# Patient Record
Sex: FEMALE | Race: WHITE | NOT HISPANIC OR LATINO
[De-identification: names, ages, dates, MRNs, and addresses within clinical notes are randomized per-mention and may not be internally consistent; named-entity substitution may affect disease eponyms.]

---

## 2017-03-09 ENCOUNTER — APPOINTMENT (OUTPATIENT)
Dept: HUMAN REPRODUCTION | Facility: CLINIC | Age: 42
End: 2017-03-09

## 2017-03-09 VITALS
DIASTOLIC BLOOD PRESSURE: 89 MMHG | RESPIRATION RATE: 17 BRPM | TEMPERATURE: 98.2 F | OXYGEN SATURATION: 98 % | SYSTOLIC BLOOD PRESSURE: 132 MMHG | HEART RATE: 85 BPM | BODY MASS INDEX: 31.09 KG/M2 | WEIGHT: 170 LBS

## 2017-03-15 ENCOUNTER — FORM ENCOUNTER (OUTPATIENT)
Age: 42
End: 2017-03-15

## 2017-03-16 ENCOUNTER — APPOINTMENT (OUTPATIENT)
Dept: RADIOLOGY | Facility: HOSPITAL | Age: 42
End: 2017-03-16

## 2017-03-16 ENCOUNTER — OUTPATIENT (OUTPATIENT)
Dept: OUTPATIENT SERVICES | Facility: HOSPITAL | Age: 42
LOS: 1 days | End: 2017-03-16
Payer: MEDICAID

## 2017-03-16 DIAGNOSIS — E66.01 MORBID (SEVERE) OBESITY DUE TO EXCESS CALORIES: ICD-10-CM

## 2017-03-16 PROCEDURE — 74220 X-RAY XM ESOPHAGUS 1CNTRST: CPT | Mod: 26

## 2017-03-16 PROCEDURE — 43999 UNLISTED PROCEDURE STOMACH: CPT

## 2017-03-16 PROCEDURE — 77002 NEEDLE LOCALIZATION BY XRAY: CPT

## 2017-05-25 ENCOUNTER — APPOINTMENT (OUTPATIENT)
Dept: DERMATOLOGY | Facility: CLINIC | Age: 42
End: 2017-05-25

## 2017-05-25 VITALS
HEIGHT: 62 IN | SYSTOLIC BLOOD PRESSURE: 128 MMHG | WEIGHT: 170 LBS | DIASTOLIC BLOOD PRESSURE: 78 MMHG | BODY MASS INDEX: 31.28 KG/M2

## 2017-05-25 DIAGNOSIS — Z84.0 FAMILY HISTORY OF DISEASES OF THE SKIN AND SUBCUTANEOUS TISSUE: ICD-10-CM

## 2017-05-25 DIAGNOSIS — L70.0 ACNE VULGARIS: ICD-10-CM

## 2017-08-07 ENCOUNTER — APPOINTMENT (OUTPATIENT)
Dept: SURGERY | Facility: CLINIC | Age: 42
End: 2017-08-07
Payer: MEDICAID

## 2017-08-07 VITALS
TEMPERATURE: 98.6 F | OXYGEN SATURATION: 99 % | WEIGHT: 186 LBS | HEIGHT: 61 IN | SYSTOLIC BLOOD PRESSURE: 125 MMHG | DIASTOLIC BLOOD PRESSURE: 95 MMHG | RESPIRATION RATE: 18 BRPM | BODY MASS INDEX: 35.12 KG/M2 | HEART RATE: 78 BPM

## 2017-08-07 PROCEDURE — 99215 OFFICE O/P EST HI 40 MIN: CPT

## 2017-08-22 ENCOUNTER — APPOINTMENT (OUTPATIENT)
Dept: ENDOCRINOLOGY | Facility: CLINIC | Age: 42
End: 2017-08-22
Payer: MEDICAID

## 2017-08-22 ENCOUNTER — APPOINTMENT (OUTPATIENT)
Dept: OBGYN | Facility: CLINIC | Age: 42
End: 2017-08-22
Payer: MEDICAID

## 2017-08-22 VITALS
BODY MASS INDEX: 36.63 KG/M2 | WEIGHT: 194 LBS | DIASTOLIC BLOOD PRESSURE: 82 MMHG | SYSTOLIC BLOOD PRESSURE: 121 MMHG | HEIGHT: 61 IN | HEART RATE: 85 BPM

## 2017-08-22 VITALS
WEIGHT: 194 LBS | HEIGHT: 61 IN | HEART RATE: 100 BPM | SYSTOLIC BLOOD PRESSURE: 120 MMHG | DIASTOLIC BLOOD PRESSURE: 80 MMHG | BODY MASS INDEX: 36.63 KG/M2 | OXYGEN SATURATION: 99 %

## 2017-08-22 DIAGNOSIS — R73.03 PREDIABETES.: ICD-10-CM

## 2017-08-22 DIAGNOSIS — E78.5 HYPERLIPIDEMIA, UNSPECIFIED: ICD-10-CM

## 2017-08-22 DIAGNOSIS — K21.9 GASTRO-ESOPHAGEAL REFLUX DISEASE W/OUT ESOPHAGITIS: ICD-10-CM

## 2017-08-22 DIAGNOSIS — R39.15 URGENCY OF URINATION: ICD-10-CM

## 2017-08-22 DIAGNOSIS — Z82.49 FAMILY HISTORY OF ISCHEMIC HEART DISEASE AND OTHER DISEASES OF THE CIRCULATORY SYSTEM: ICD-10-CM

## 2017-08-22 DIAGNOSIS — N39.46 MIXED INCONTINENCE: ICD-10-CM

## 2017-08-22 DIAGNOSIS — E28.2 POLYCYSTIC OVARIAN SYNDROME: ICD-10-CM

## 2017-08-22 PROCEDURE — 99205 OFFICE O/P NEW HI 60 MIN: CPT

## 2017-08-22 PROCEDURE — 99214 OFFICE O/P EST MOD 30 MIN: CPT

## 2017-08-22 RX ORDER — TRETINOIN 0.25 MG/G
0.03 CREAM TOPICAL
Qty: 1 | Refills: 4 | Status: DISCONTINUED | COMMUNITY
Start: 2017-05-25 | End: 2017-08-22

## 2017-08-23 ENCOUNTER — APPOINTMENT (OUTPATIENT)
Dept: OBGYN | Facility: CLINIC | Age: 42
End: 2017-08-23
Payer: MEDICAID

## 2017-08-23 ENCOUNTER — LABORATORY RESULT (OUTPATIENT)
Age: 42
End: 2017-08-23

## 2017-08-23 ENCOUNTER — ASOB RESULT (OUTPATIENT)
Age: 42
End: 2017-08-23

## 2017-08-23 PROCEDURE — 76830 TRANSVAGINAL US NON-OB: CPT

## 2017-08-25 LAB
ESTRADIOL SERPL-MCNC: 58 PG/ML
FSH SERPL-MCNC: 6 IU/L
GLUCOSE BLDC GLUCOMTR-MCNC: 185
HCG SERPL-MCNC: <1 MIU/ML
INSULIN P FAST SERPL-ACNC: 21.6 UU/ML
LH SERPL-ACNC: 10.7 IU/L
SHBG SERPL-SCNC: 22 NMOL/L
TESTOST SERPL-MCNC: 22.7 NG/DL

## 2017-08-28 LAB — BACTERIA UR CULT: NORMAL

## 2017-11-17 ENCOUNTER — APPOINTMENT (OUTPATIENT)
Dept: SURGERY | Facility: CLINIC | Age: 42
End: 2017-11-17
Payer: MEDICAID

## 2017-11-17 PROCEDURE — 99215 OFFICE O/P EST HI 40 MIN: CPT

## 2017-11-28 ENCOUNTER — OUTPATIENT (OUTPATIENT)
Dept: OUTPATIENT SERVICES | Facility: HOSPITAL | Age: 42
LOS: 1 days | End: 2017-11-28
Payer: MEDICAID

## 2017-11-28 VITALS
WEIGHT: 186.07 LBS | DIASTOLIC BLOOD PRESSURE: 84 MMHG | SYSTOLIC BLOOD PRESSURE: 130 MMHG | HEART RATE: 76 BPM | HEIGHT: 61 IN | TEMPERATURE: 98 F | RESPIRATION RATE: 16 BRPM | OXYGEN SATURATION: 98 %

## 2017-11-28 DIAGNOSIS — E66.01 MORBID (SEVERE) OBESITY DUE TO EXCESS CALORIES: ICD-10-CM

## 2017-11-28 DIAGNOSIS — Z98.84 BARIATRIC SURGERY STATUS: Chronic | ICD-10-CM

## 2017-11-28 DIAGNOSIS — Z01.818 ENCOUNTER FOR OTHER PREPROCEDURAL EXAMINATION: ICD-10-CM

## 2017-11-28 LAB
ALBUMIN SERPL ELPH-MCNC: 4.6 G/DL — SIGNIFICANT CHANGE UP (ref 3.3–5)
ALP SERPL-CCNC: 56 U/L — SIGNIFICANT CHANGE UP (ref 40–120)
ALT FLD-CCNC: 14 U/L — SIGNIFICANT CHANGE UP (ref 10–45)
ANION GAP SERPL CALC-SCNC: 15 MMOL/L — SIGNIFICANT CHANGE UP (ref 5–17)
AST SERPL-CCNC: 17 U/L — SIGNIFICANT CHANGE UP (ref 10–40)
BILIRUB SERPL-MCNC: 0.3 MG/DL — SIGNIFICANT CHANGE UP (ref 0.2–1.2)
BLD GP AB SCN SERPL QL: NEGATIVE — SIGNIFICANT CHANGE UP
BUN SERPL-MCNC: 10 MG/DL — SIGNIFICANT CHANGE UP (ref 7–23)
CALCIUM SERPL-MCNC: 10 MG/DL — SIGNIFICANT CHANGE UP (ref 8.4–10.5)
CHLORIDE SERPL-SCNC: 98 MMOL/L — SIGNIFICANT CHANGE UP (ref 96–108)
CO2 SERPL-SCNC: 25 MMOL/L — SIGNIFICANT CHANGE UP (ref 22–31)
CREAT SERPL-MCNC: 0.65 MG/DL — SIGNIFICANT CHANGE UP (ref 0.5–1.3)
GLUCOSE SERPL-MCNC: 130 MG/DL — HIGH (ref 70–99)
HBA1C BLD-MCNC: 5.6 % — SIGNIFICANT CHANGE UP (ref 4–5.6)
HCT VFR BLD CALC: 42.6 % — SIGNIFICANT CHANGE UP (ref 34.5–45)
HGB BLD-MCNC: 14.4 G/DL — SIGNIFICANT CHANGE UP (ref 11.5–15.5)
MCHC RBC-ENTMCNC: 30.2 PG — SIGNIFICANT CHANGE UP (ref 27–34)
MCHC RBC-ENTMCNC: 33.8 GM/DL — SIGNIFICANT CHANGE UP (ref 32–36)
MCV RBC AUTO: 89.3 FL — SIGNIFICANT CHANGE UP (ref 80–100)
PLATELET # BLD AUTO: 273 K/UL — SIGNIFICANT CHANGE UP (ref 150–400)
POTASSIUM SERPL-MCNC: 4.9 MMOL/L — SIGNIFICANT CHANGE UP (ref 3.5–5.3)
POTASSIUM SERPL-SCNC: 4.9 MMOL/L — SIGNIFICANT CHANGE UP (ref 3.5–5.3)
PROT SERPL-MCNC: 8.4 G/DL — HIGH (ref 6–8.3)
RBC # BLD: 4.77 M/UL — SIGNIFICANT CHANGE UP (ref 3.8–5.2)
RBC # FLD: 12.9 % — SIGNIFICANT CHANGE UP (ref 10.3–14.5)
RH IG SCN BLD-IMP: NEGATIVE — SIGNIFICANT CHANGE UP
SODIUM SERPL-SCNC: 138 MMOL/L — SIGNIFICANT CHANGE UP (ref 135–145)
WBC # BLD: 6.03 K/UL — SIGNIFICANT CHANGE UP (ref 3.8–10.5)
WBC # FLD AUTO: 6.03 K/UL — SIGNIFICANT CHANGE UP (ref 3.8–10.5)

## 2017-11-28 PROCEDURE — 80053 COMPREHEN METABOLIC PANEL: CPT

## 2017-11-28 PROCEDURE — 83036 HEMOGLOBIN GLYCOSYLATED A1C: CPT

## 2017-11-28 PROCEDURE — 86901 BLOOD TYPING SEROLOGIC RH(D): CPT

## 2017-11-28 PROCEDURE — G0463: CPT

## 2017-11-28 PROCEDURE — 86900 BLOOD TYPING SEROLOGIC ABO: CPT

## 2017-11-28 PROCEDURE — 85027 COMPLETE CBC AUTOMATED: CPT

## 2017-11-28 PROCEDURE — 86850 RBC ANTIBODY SCREEN: CPT

## 2017-11-28 RX ORDER — CEFAZOLIN SODIUM 1 G
2000 VIAL (EA) INJECTION ONCE
Qty: 0 | Refills: 0 | Status: DISCONTINUED | OUTPATIENT
Start: 2017-12-06 | End: 2017-12-07

## 2017-11-28 NOTE — H&P PST ADULT - HISTORY OF PRESENT ILLNESS
35 year old female with long history of obesity and trouble maintaining a weight loss.  Now for lap band surgery. 41 yo female, PMH morbid obesity s/p lap band 11/29/17, pt. having problems with GERD with lap band inflation, lost 50 lbs, but gained 30 lbs back. Pt. returns to [facility] today for Laparoscopic Removal of Lap Band, Laparoscopic Vertical Sleeve Gastrectomy on 12/6/17. Pt. denies recent fever, chills, chest pain, SOB, changes in bowel/urinary habits.  Raimundo Slade, pharmacist, will call pt. to go over current meds and post-op meds

## 2017-11-28 NOTE — H&P PST ADULT - NEUROLOGICAL DETAILS
alert and oriented x 3 alert and oriented x 3/sensation intact/responds to verbal commands/normal strength

## 2017-11-28 NOTE — H&P PST ADULT - PSH
S/P Nasal Septoplasty  2007  S/P Tonsillectomy  2001 S/P bariatric surgery  lap band 11/29/11  S/P Nasal Septoplasty  2007  S/P Tonsillectomy  2001

## 2017-11-28 NOTE — H&P PST ADULT - PROBLEM SELECTOR PLAN 1
Laparoscopic Removal of Lap Band, Laparoscopic Vertical Sleeve Gastrectomy  Pre-op education, including Chlorhexidine soap, provided - all questions answered

## 2017-11-28 NOTE — H&P PST ADULT - PMH
Hyperlipidemia    Morbid Obesity    Polycystic Ovarian Syndrome Morbid Obesity  BMI 35.2 on 11/28/17  Polycystic Ovarian Syndrome  not taking medications at this time

## 2017-11-28 NOTE — H&P PST ADULT - NSANTHOSAYNRD_GEN_A_CORE
No. IONA screening performed.  STOP BANG Legend: 0-2 = LOW Risk; 3-4 = INTERMEDIATE Risk; 5-8 = HIGH Risk

## 2017-11-28 NOTE — H&P PST ADULT - MUSCULOSKELETAL
details… No joint pain, swelling or deformity; no limitation of movement detailed exam normal strength/no calf tenderness negative

## 2017-11-28 NOTE — H&P PST ADULT - RS GEN PE MLT RESP DETAILS PC
clear to auscultation bilaterally no rhonchi/no rales/no wheezes/respirations non-labored/clear to auscultation bilaterally

## 2017-12-06 ENCOUNTER — INPATIENT (INPATIENT)
Facility: HOSPITAL | Age: 42
LOS: 0 days | Discharge: HOME | DRG: 621 | End: 2017-12-07
Attending: SURGERY | Admitting: SURGERY
Payer: MEDICAID

## 2017-12-06 ENCOUNTER — APPOINTMENT (OUTPATIENT)
Dept: SURGERY | Facility: HOSPITAL | Age: 42
End: 2017-12-06
Payer: MEDICAID

## 2017-12-06 ENCOUNTER — TRANSCRIPTION ENCOUNTER (OUTPATIENT)
Age: 42
End: 2017-12-06

## 2017-12-06 ENCOUNTER — RESULT REVIEW (OUTPATIENT)
Age: 42
End: 2017-12-06

## 2017-12-06 VITALS
OXYGEN SATURATION: 98 % | WEIGHT: 186.07 LBS | DIASTOLIC BLOOD PRESSURE: 88 MMHG | SYSTOLIC BLOOD PRESSURE: 123 MMHG | TEMPERATURE: 98 F | RESPIRATION RATE: 20 BRPM | HEIGHT: 61 IN | HEART RATE: 82 BPM

## 2017-12-06 DIAGNOSIS — E66.01 MORBID (SEVERE) OBESITY DUE TO EXCESS CALORIES: ICD-10-CM

## 2017-12-06 DIAGNOSIS — Z98.84 BARIATRIC SURGERY STATUS: Chronic | ICD-10-CM

## 2017-12-06 LAB
ANION GAP SERPL CALC-SCNC: 15 MMOL/L — SIGNIFICANT CHANGE UP (ref 5–17)
BASOPHILS # BLD AUTO: 0 K/UL — SIGNIFICANT CHANGE UP (ref 0–0.2)
BASOPHILS NFR BLD AUTO: 0.1 % — SIGNIFICANT CHANGE UP (ref 0–2)
BUN SERPL-MCNC: 10 MG/DL — SIGNIFICANT CHANGE UP (ref 7–23)
CALCIUM SERPL-MCNC: 8.9 MG/DL — SIGNIFICANT CHANGE UP (ref 8.4–10.5)
CHLORIDE SERPL-SCNC: 99 MMOL/L — SIGNIFICANT CHANGE UP (ref 96–108)
CO2 SERPL-SCNC: 23 MMOL/L — SIGNIFICANT CHANGE UP (ref 22–31)
CREAT SERPL-MCNC: 0.7 MG/DL — SIGNIFICANT CHANGE UP (ref 0.5–1.3)
EOSINOPHIL # BLD AUTO: 0 K/UL — SIGNIFICANT CHANGE UP (ref 0–0.5)
EOSINOPHIL NFR BLD AUTO: 0.3 % — SIGNIFICANT CHANGE UP (ref 0–6)
GLUCOSE BLDC GLUCOMTR-MCNC: 86 MG/DL — SIGNIFICANT CHANGE UP (ref 70–99)
GLUCOSE SERPL-MCNC: 178 MG/DL — HIGH (ref 70–99)
HCG UR QL: NEGATIVE — SIGNIFICANT CHANGE UP
HCT VFR BLD CALC: 38.1 % — SIGNIFICANT CHANGE UP (ref 34.5–45)
HGB BLD-MCNC: 13.4 G/DL — SIGNIFICANT CHANGE UP (ref 11.5–15.5)
LYMPHOCYTES # BLD AUTO: 0.7 K/UL — LOW (ref 1–3.3)
LYMPHOCYTES # BLD AUTO: 6.1 % — LOW (ref 13–44)
MAGNESIUM SERPL-MCNC: 2 MG/DL — SIGNIFICANT CHANGE UP (ref 1.6–2.6)
MCHC RBC-ENTMCNC: 32.3 PG — SIGNIFICANT CHANGE UP (ref 27–34)
MCHC RBC-ENTMCNC: 35.2 GM/DL — SIGNIFICANT CHANGE UP (ref 32–36)
MCV RBC AUTO: 91.9 FL — SIGNIFICANT CHANGE UP (ref 80–100)
MONOCYTES # BLD AUTO: 0.1 K/UL — SIGNIFICANT CHANGE UP (ref 0–0.9)
MONOCYTES NFR BLD AUTO: 1 % — LOW (ref 2–14)
NEUTROPHILS # BLD AUTO: 10.7 K/UL — HIGH (ref 1.8–7.4)
NEUTROPHILS NFR BLD AUTO: 92.5 % — HIGH (ref 43–77)
PHOSPHATE SERPL-MCNC: 2.9 MG/DL — SIGNIFICANT CHANGE UP (ref 2.5–4.5)
PLATELET # BLD AUTO: 203 K/UL — SIGNIFICANT CHANGE UP (ref 150–400)
POTASSIUM SERPL-MCNC: 4.1 MMOL/L — SIGNIFICANT CHANGE UP (ref 3.5–5.3)
POTASSIUM SERPL-SCNC: 4.1 MMOL/L — SIGNIFICANT CHANGE UP (ref 3.5–5.3)
RBC # BLD: 4.14 M/UL — SIGNIFICANT CHANGE UP (ref 3.8–5.2)
RBC # FLD: 11.9 % — SIGNIFICANT CHANGE UP (ref 10.3–14.5)
SODIUM SERPL-SCNC: 137 MMOL/L — SIGNIFICANT CHANGE UP (ref 135–145)
WBC # BLD: 11.6 K/UL — HIGH (ref 3.8–10.5)
WBC # FLD AUTO: 11.6 K/UL — HIGH (ref 3.8–10.5)

## 2017-12-06 PROCEDURE — 43775 LAP SLEEVE GASTRECTOMY: CPT

## 2017-12-06 PROCEDURE — 88300 SURGICAL PATH GROSS: CPT | Mod: 26,59

## 2017-12-06 PROCEDURE — 43774 LAP RMVL GASTR ADJ ALL PARTS: CPT

## 2017-12-06 PROCEDURE — 88305 TISSUE EXAM BY PATHOLOGIST: CPT | Mod: 26

## 2017-12-06 RX ORDER — POTASSIUM CHLORIDE 20 MEQ
10 PACKET (EA) ORAL
Qty: 0 | Refills: 0 | Status: DISCONTINUED | OUTPATIENT
Start: 2017-12-06 | End: 2017-12-07

## 2017-12-06 RX ORDER — HEPARIN SODIUM 5000 [USP'U]/ML
5000 INJECTION INTRAVENOUS; SUBCUTANEOUS EVERY 8 HOURS
Qty: 0 | Refills: 0 | Status: DISCONTINUED | OUTPATIENT
Start: 2017-12-06 | End: 2017-12-07

## 2017-12-06 RX ORDER — HYOSCYAMINE SULFATE 0.13 MG
0.12 TABLET ORAL EVERY 6 HOURS
Qty: 0 | Refills: 0 | Status: DISCONTINUED | OUTPATIENT
Start: 2017-12-06 | End: 2017-12-07

## 2017-12-06 RX ORDER — ONDANSETRON 8 MG/1
4 TABLET, FILM COATED ORAL ONCE
Qty: 0 | Refills: 0 | Status: DISCONTINUED | OUTPATIENT
Start: 2017-12-06 | End: 2017-12-06

## 2017-12-06 RX ORDER — PANTOPRAZOLE SODIUM 20 MG/1
40 TABLET, DELAYED RELEASE ORAL DAILY
Qty: 0 | Refills: 0 | Status: DISCONTINUED | OUTPATIENT
Start: 2017-12-06 | End: 2017-12-07

## 2017-12-06 RX ORDER — SODIUM CHLORIDE 9 MG/ML
1000 INJECTION, SOLUTION INTRAVENOUS
Qty: 0 | Refills: 0 | Status: COMPLETED | OUTPATIENT
Start: 2017-12-06 | End: 2017-12-06

## 2017-12-06 RX ORDER — ONDANSETRON 8 MG/1
4 TABLET, FILM COATED ORAL EVERY 6 HOURS
Qty: 0 | Refills: 0 | Status: DISCONTINUED | OUTPATIENT
Start: 2017-12-06 | End: 2017-12-07

## 2017-12-06 RX ORDER — CEFAZOLIN SODIUM 1 G
2000 VIAL (EA) INJECTION EVERY 8 HOURS
Qty: 0 | Refills: 0 | Status: COMPLETED | OUTPATIENT
Start: 2017-12-06 | End: 2017-12-07

## 2017-12-06 RX ORDER — LIDOCAINE HCL 20 MG/ML
0.2 VIAL (ML) INJECTION ONCE
Qty: 0 | Refills: 0 | Status: DISCONTINUED | OUTPATIENT
Start: 2017-12-06 | End: 2017-12-06

## 2017-12-06 RX ORDER — SODIUM CHLORIDE 9 MG/ML
3 INJECTION INTRAMUSCULAR; INTRAVENOUS; SUBCUTANEOUS EVERY 8 HOURS
Qty: 0 | Refills: 0 | Status: DISCONTINUED | OUTPATIENT
Start: 2017-12-06 | End: 2017-12-06

## 2017-12-06 RX ORDER — SODIUM CHLORIDE 9 MG/ML
1000 INJECTION, SOLUTION INTRAVENOUS
Qty: 0 | Refills: 0 | Status: DISCONTINUED | OUTPATIENT
Start: 2017-12-06 | End: 2017-12-07

## 2017-12-06 RX ORDER — ACETAMINOPHEN 500 MG
1000 TABLET ORAL ONCE
Qty: 0 | Refills: 0 | Status: COMPLETED | OUTPATIENT
Start: 2017-12-07 | End: 2017-12-07

## 2017-12-06 RX ORDER — HYDROMORPHONE HYDROCHLORIDE 2 MG/ML
0.5 INJECTION INTRAMUSCULAR; INTRAVENOUS; SUBCUTANEOUS
Qty: 0 | Refills: 0 | Status: DISCONTINUED | OUTPATIENT
Start: 2017-12-06 | End: 2017-12-06

## 2017-12-06 RX ORDER — ACETAMINOPHEN 500 MG
1000 TABLET ORAL ONCE
Qty: 0 | Refills: 0 | Status: COMPLETED | OUTPATIENT
Start: 2017-12-06 | End: 2017-12-06

## 2017-12-06 RX ORDER — KETOROLAC TROMETHAMINE 30 MG/ML
30 SYRINGE (ML) INJECTION EVERY 6 HOURS
Qty: 0 | Refills: 0 | Status: DISCONTINUED | OUTPATIENT
Start: 2017-12-06 | End: 2017-12-07

## 2017-12-06 RX ORDER — HEPARIN SODIUM 5000 [USP'U]/ML
5000 INJECTION INTRAVENOUS; SUBCUTANEOUS EVERY 12 HOURS
Qty: 0 | Refills: 0 | Status: DISCONTINUED | OUTPATIENT
Start: 2017-12-06 | End: 2017-12-06

## 2017-12-06 RX ADMIN — Medication 100 MILLIGRAM(S): at 23:15

## 2017-12-06 RX ADMIN — Medication 30 MILLIGRAM(S): at 23:14

## 2017-12-06 RX ADMIN — HYDROMORPHONE HYDROCHLORIDE 0.5 MILLIGRAM(S): 2 INJECTION INTRAMUSCULAR; INTRAVENOUS; SUBCUTANEOUS at 14:15

## 2017-12-06 RX ADMIN — Medication 1000 MILLIGRAM(S): at 23:55

## 2017-12-06 RX ADMIN — HEPARIN SODIUM 5000 UNIT(S): 5000 INJECTION INTRAVENOUS; SUBCUTANEOUS at 09:22

## 2017-12-06 RX ADMIN — Medication 0.12 MILLIGRAM(S): at 23:13

## 2017-12-06 RX ADMIN — SODIUM CHLORIDE 3 MILLILITER(S): 9 INJECTION INTRAMUSCULAR; INTRAVENOUS; SUBCUTANEOUS at 09:18

## 2017-12-06 RX ADMIN — PANTOPRAZOLE SODIUM 40 MILLIGRAM(S): 20 TABLET, DELAYED RELEASE ORAL at 14:24

## 2017-12-06 RX ADMIN — SODIUM CHLORIDE 150 MILLILITER(S): 9 INJECTION, SOLUTION INTRAVENOUS at 14:24

## 2017-12-06 RX ADMIN — SODIUM CHLORIDE 250 MILLILITER(S): 9 INJECTION, SOLUTION INTRAVENOUS at 18:09

## 2017-12-06 RX ADMIN — Medication 30 MILLIGRAM(S): at 18:08

## 2017-12-06 RX ADMIN — Medication 30 MILLIGRAM(S): at 23:55

## 2017-12-06 RX ADMIN — Medication 0.12 MILLIGRAM(S): at 18:08

## 2017-12-06 RX ADMIN — ONDANSETRON 4 MILLIGRAM(S): 8 TABLET, FILM COATED ORAL at 23:14

## 2017-12-06 RX ADMIN — HYDROMORPHONE HYDROCHLORIDE 0.5 MILLIGRAM(S): 2 INJECTION INTRAMUSCULAR; INTRAVENOUS; SUBCUTANEOUS at 14:00

## 2017-12-06 RX ADMIN — HEPARIN SODIUM 5000 UNIT(S): 5000 INJECTION INTRAVENOUS; SUBCUTANEOUS at 23:14

## 2017-12-06 RX ADMIN — Medication 400 MILLIGRAM(S): at 23:25

## 2017-12-06 NOTE — PHARMACY COMMUNICATION NOTE - COMMENTS
Patient medication reconciliation done. Patient currently taking:     Biotin 5000mcg by mouth daily  Vitamin D3 SL tab 2000units by mouth daily    Patient was informed to take daily multivitamins post surgically. Patient reeducated on NSAID avoidance (ibuprofen, ASA, naproxen, aleve) as they increased risk of GI bleeding. Patient informed to use APAP for mild pain otherwise contact prescriber for consult. Patient was informed on indications and directions for administration for hyoscyamine SL, oxycodone liquid, ondansetron ODT, and omeprazole . Patient was instructed to take the medications as follows:     Continue biotin and vitamin D3

## 2017-12-06 NOTE — CHART NOTE - NSCHARTNOTEFT_GEN_A_CORE
Post Operative Check    Patient is post op from a Removal, gastric band, laparoscopic Gastrectomy, sleeve, laparoscopic and is seen at bedside.  Denies chest pain, dyspnea, nausea, vomiting.    Vitals    T(C): 37.1 (12-06-17 @ 13:45), Max: 37.1 (12-06-17 @ 13:45)  HR: 81 (12-06-17 @ 16:00) (68 - 85)  BP: 128/79 (12-06-17 @ 16:00) (119/73 - 131/81)  RR: 16 (12-06-17 @ 16:00) (16 - 20)  SpO2: 98% (12-06-17 @ 16:00) (96% - 99%)      12-06 @ 07:01  -  12-06 @ 16:49  --------------------------------------------------------  IN:    lactated ringers.: 300 mL  Total IN: 300 mL    OUT:    Indwelling Catheter - Urethral: 350 mL  Total OUT: 350 mL    Total NET: -50 mL          Labs                        13.4   11.6  )-----------( 203      ( 06 Dec 2017 14:39 )             38.1       CBC Full  -  ( 06 Dec 2017 14:39 )  WBC Count : 11.6 K/uL  Hemoglobin : 13.4 g/dL  Hematocrit : 38.1 %  Platelet Count - Automated : 203 K/uL  Mean Cell Volume : 91.9 fl  Mean Cell Hemoglobin : 32.3 pg  Mean Cell Hemoglobin Concentration : 35.2 gm/dL  Auto Neutrophil # : 10.7 K/uL  Auto Lymphocyte # : 0.7 K/uL  Auto Monocyte # : 0.1 K/uL  Auto Eosinophil # : 0.0 K/uL  Auto Basophil # : 0.0 K/uL  Auto Neutrophil % : 92.5 %  Auto Lymphocyte % : 6.1 %  Auto Monocyte % : 1.0 %  Auto Eosinophil % : 0.3 %  Auto Basophil % : 0.1 %      Physical Exam  General: Lying in bed, NAD  CV: RRR  Pulm: clear, no increased work of breathing  Abdomen: soft, ND, appropriately tender, incisions C/D/I  Extremities: warm and well perfused. Grossly intact.      Patient is a 42y old Female s/p Removal of gastric band and laparoscopic sleeve Gastrectomy     - Pain control  - DVT ppx  - F/u AM labs  - NPO  - SQH  - Monitor UOP

## 2017-12-06 NOTE — BRIEF OPERATIVE NOTE - OPERATION/FINDINGS
Lap band in place with question of slippage.  Gastric band and port removed.  Sleeve gastrectomy performed over 36-Fr calibration tube. No leak on methylene blue test.  Staple line reinforced with Evicel.  Omentopexy performed.

## 2017-12-06 NOTE — PATIENT PROFILE ADULT. - PMH
Morbid Obesity  BMI 35.2 on 11/28/17  Polycystic Ovarian Syndrome  not taking medications at this time

## 2017-12-06 NOTE — BRIEF OPERATIVE NOTE - PRE-OP DX
Gastric band malfunction  12/06/2017    Active  Rahel Austin  Morbid obesity due to excess calories  12/06/2017    Active  Rahel Austin

## 2017-12-06 NOTE — CONSULT NOTE ADULT - ATTENDING COMMENTS
CARDIOLOGY ATTENDING    Agree with above. Tolerated procedure well. Is stable from cardiac perspective without any indications for further cardiac testing at this time.

## 2017-12-06 NOTE — BRIEF OPERATIVE NOTE - PROCEDURE
<<-----Click on this checkbox to enter Procedure Gastrectomy, sleeve, laparoscopic  12/06/2017    Active  SMARKHAM  Removal, gastric band, laparoscopic  12/06/2017    Active  SMARCORIAM

## 2017-12-07 ENCOUNTER — TRANSCRIPTION ENCOUNTER (OUTPATIENT)
Age: 42
End: 2017-12-07

## 2017-12-07 VITALS
OXYGEN SATURATION: 96 % | DIASTOLIC BLOOD PRESSURE: 75 MMHG | HEART RATE: 86 BPM | RESPIRATION RATE: 18 BRPM | TEMPERATURE: 98 F | SYSTOLIC BLOOD PRESSURE: 112 MMHG

## 2017-12-07 LAB
ANION GAP SERPL CALC-SCNC: 10 MMOL/L — SIGNIFICANT CHANGE UP (ref 5–17)
BASOPHILS # BLD AUTO: 0.1 K/UL — SIGNIFICANT CHANGE UP (ref 0–0.2)
BASOPHILS NFR BLD AUTO: 0.6 % — SIGNIFICANT CHANGE UP (ref 0–2)
BUN SERPL-MCNC: 8 MG/DL — SIGNIFICANT CHANGE UP (ref 7–23)
CALCIUM SERPL-MCNC: 8.5 MG/DL — SIGNIFICANT CHANGE UP (ref 8.4–10.5)
CHLORIDE SERPL-SCNC: 103 MMOL/L — SIGNIFICANT CHANGE UP (ref 96–108)
CO2 SERPL-SCNC: 23 MMOL/L — SIGNIFICANT CHANGE UP (ref 22–31)
CREAT SERPL-MCNC: 0.61 MG/DL — SIGNIFICANT CHANGE UP (ref 0.5–1.3)
EOSINOPHIL # BLD AUTO: 0 K/UL — SIGNIFICANT CHANGE UP (ref 0–0.5)
EOSINOPHIL NFR BLD AUTO: 0.2 % — SIGNIFICANT CHANGE UP (ref 0–6)
GLUCOSE SERPL-MCNC: 95 MG/DL — SIGNIFICANT CHANGE UP (ref 70–99)
HCT VFR BLD CALC: 36.9 % — SIGNIFICANT CHANGE UP (ref 34.5–45)
HGB BLD-MCNC: 12.7 G/DL — SIGNIFICANT CHANGE UP (ref 11.5–15.5)
LYMPHOCYTES # BLD AUTO: 1.1 K/UL — SIGNIFICANT CHANGE UP (ref 1–3.3)
LYMPHOCYTES # BLD AUTO: 11.1 % — LOW (ref 13–44)
MCHC RBC-ENTMCNC: 31.9 PG — SIGNIFICANT CHANGE UP (ref 27–34)
MCHC RBC-ENTMCNC: 34.4 GM/DL — SIGNIFICANT CHANGE UP (ref 32–36)
MCV RBC AUTO: 92.6 FL — SIGNIFICANT CHANGE UP (ref 80–100)
MONOCYTES # BLD AUTO: 0.7 K/UL — SIGNIFICANT CHANGE UP (ref 0–0.9)
MONOCYTES NFR BLD AUTO: 6.9 % — SIGNIFICANT CHANGE UP (ref 2–14)
NEUTROPHILS # BLD AUTO: 7.9 K/UL — HIGH (ref 1.8–7.4)
NEUTROPHILS NFR BLD AUTO: 81.2 % — HIGH (ref 43–77)
PLATELET # BLD AUTO: 212 K/UL — SIGNIFICANT CHANGE UP (ref 150–400)
POTASSIUM SERPL-MCNC: 3.7 MMOL/L — SIGNIFICANT CHANGE UP (ref 3.5–5.3)
POTASSIUM SERPL-SCNC: 3.7 MMOL/L — SIGNIFICANT CHANGE UP (ref 3.5–5.3)
RBC # BLD: 3.98 M/UL — SIGNIFICANT CHANGE UP (ref 3.8–5.2)
RBC # FLD: 11.8 % — SIGNIFICANT CHANGE UP (ref 10.3–14.5)
SODIUM SERPL-SCNC: 136 MMOL/L — SIGNIFICANT CHANGE UP (ref 135–145)
WBC # BLD: 9.8 K/UL — SIGNIFICANT CHANGE UP (ref 3.8–10.5)
WBC # FLD AUTO: 9.8 K/UL — SIGNIFICANT CHANGE UP (ref 3.8–10.5)

## 2017-12-07 PROCEDURE — 81025 URINE PREGNANCY TEST: CPT

## 2017-12-07 PROCEDURE — C1889: CPT

## 2017-12-07 PROCEDURE — 88305 TISSUE EXAM BY PATHOLOGIST: CPT

## 2017-12-07 PROCEDURE — 84100 ASSAY OF PHOSPHORUS: CPT

## 2017-12-07 PROCEDURE — 85027 COMPLETE CBC AUTOMATED: CPT

## 2017-12-07 PROCEDURE — 83735 ASSAY OF MAGNESIUM: CPT

## 2017-12-07 PROCEDURE — 82962 GLUCOSE BLOOD TEST: CPT

## 2017-12-07 PROCEDURE — 88300 SURGICAL PATH GROSS: CPT

## 2017-12-07 PROCEDURE — 80048 BASIC METABOLIC PNL TOTAL CA: CPT

## 2017-12-07 RX ORDER — ONDANSETRON 8 MG/1
1 TABLET, FILM COATED ORAL
Qty: 28 | Refills: 0
Start: 2017-12-07 | End: 2017-12-14

## 2017-12-07 RX ORDER — HYOSCYAMINE SULFATE 0.13 MG
1 TABLET ORAL
Qty: 28 | Refills: 0
Start: 2017-12-07 | End: 2017-12-14

## 2017-12-07 RX ORDER — OXYCODONE HYDROCHLORIDE 5 MG/1
5 TABLET ORAL
Qty: 120 | Refills: 0
Start: 2017-12-07 | End: 2017-12-11

## 2017-12-07 RX ORDER — OMEPRAZOLE 10 MG/1
1 CAPSULE, DELAYED RELEASE ORAL
Qty: 30 | Refills: 0 | OUTPATIENT
Start: 2017-12-07 | End: 2018-01-06

## 2017-12-07 RX ORDER — OMEPRAZOLE 10 MG/1
1 CAPSULE, DELAYED RELEASE ORAL
Qty: 30 | Refills: 0
Start: 2017-12-07 | End: 2018-01-06

## 2017-12-07 RX ADMIN — Medication 30 MILLIGRAM(S): at 05:41

## 2017-12-07 RX ADMIN — PANTOPRAZOLE SODIUM 40 MILLIGRAM(S): 20 TABLET, DELAYED RELEASE ORAL at 12:19

## 2017-12-07 RX ADMIN — Medication 30 MILLIGRAM(S): at 12:52

## 2017-12-07 RX ADMIN — Medication 400 MILLIGRAM(S): at 13:20

## 2017-12-07 RX ADMIN — Medication 100 MILLIGRAM(S): at 05:41

## 2017-12-07 RX ADMIN — Medication 30 MILLIGRAM(S): at 06:20

## 2017-12-07 RX ADMIN — HEPARIN SODIUM 5000 UNIT(S): 5000 INJECTION INTRAVENOUS; SUBCUTANEOUS at 13:20

## 2017-12-07 RX ADMIN — HEPARIN SODIUM 5000 UNIT(S): 5000 INJECTION INTRAVENOUS; SUBCUTANEOUS at 05:41

## 2017-12-07 RX ADMIN — Medication 30 MILLIGRAM(S): at 12:19

## 2017-12-07 RX ADMIN — Medication 400 MILLIGRAM(S): at 05:41

## 2017-12-07 RX ADMIN — Medication 0.12 MILLIGRAM(S): at 12:19

## 2017-12-07 RX ADMIN — Medication 1000 MILLIGRAM(S): at 06:20

## 2017-12-07 RX ADMIN — Medication 0.12 MILLIGRAM(S): at 06:32

## 2017-12-07 NOTE — DISCHARGE NOTE ADULT - HOSPITAL COURSE
On 12/6/17,  pt underwent laparoscopic sleeve gastrectomy and removal of gastric band. She tolerated the procedure well, was extubated and sent to PACU  in  stable condition.  She remained hemodynamically stable and was transferred to a bariatric unit with bedside continuous pulse oximetry.  Her pain was controlled by IV pain medications and then transitioned to liquid oral pain medications.  Max catheter was placed intraoperatively, catheter was subsequently removed and he passed trial of void.  POD #1 she was started on a bariatric clear liquid diet, which he tolerated.  She is ambulating independently, voiding, tolerating a diet, and with effective pain control.  She is stable for discharge home and will follow-up with Dr. Dean in 1-2 weeks.  She was also advised to follow-up with her primary medical doctor in 1-2 weeks as well as his nutritionist. On December 6, 2017 Ms. underwent Laparoscopic removal of adjustable gastric band converted to Laparoscopic Vertical Sleeve Gastrectomy for weight regain. She received VTE and SSI prophylaxis prior to start of procedure. Following intubation an indwelling taylor was placed . Procedure went as planned and she was subsequently extubated in the OR and taken to the recovery room. Postoperatively her pain was managed with opioid and non-opioid analgesia. Once hemodynamically stable she ambulated with assistance. She was later transferred to the bariatric unit and placed on bedside continuous pulse oximetry. Indwelling following taylor removed day of surgery and she had spontaneous return of bladder function.      On POD#1, she remained stable, had effective pain control and ambulated independently around the unit. She tolerated bariatric clear diet without nausea and or vomiting. The rest of her hospital course was uneventful and she was subsequently cleared for discharge. Procedure specific written discharge instructions explained, written materials given to include signs and symptoms of postop complications and VTE prevention. Routine bariatric medications obtained from pharmacy. She will follow a protocol-derived staged meal progression supervised by her dietician and follow up in 30 days. She will follow up with Dr Miner in 7-10 days with subsequent visits at one, three and six months, and then annually.

## 2017-12-07 NOTE — PROGRESS NOTE ADULT - ATTENDING COMMENTS
Patient seen and examined, agree with above assessment and plan as transcribed above.    - D/C plan per surgery    Raheem Cash MD, [facility], [facility]  [address] [place] [address]  PHONE:  [phone]  BEEPER : [phone]

## 2017-12-07 NOTE — PROGRESS NOTE ADULT - ASSESSMENT
Assessment and Plan:  42y y/o Female s/p Removal, gastric band, laparoscopic  Gastrectomy, sleeve, laparoscopic  , POD # 1    - Start PO liquid oxycodone PRN for pain  - Continue ambulation   - Encourage Incentive Spirometer use  - Bariartic clears   - Continue chemical and mechanical DVT prophylaxis  - Discharge home once tolerating adequate PO and 8 point discharge criteria met    Discuss with attending

## 2017-12-07 NOTE — DIETITIAN INITIAL EVALUATION ADULT. - ENERGY NEEDS
Ht: 61“, Wt: 186.1 lbs- presurgical, BMI: 35.2 kg/m2, IBW: 105 lbs (+/-10%)  no edema or pressure injuries

## 2017-12-07 NOTE — PROGRESS NOTE ADULT - SUBJECTIVE AND OBJECTIVE BOX
Bariatric Surgery Progress Note    Post Op Day#: 1    24 hr events/Subjective:     No acute issues overnight  Pain controlled with medications  Nausea controlled with anti-ematics   Voiding      Procedure:  Removal, gastric band, laparoscopic  Gastrectomy, sleeve, laparoscopic      Vital Signs Last 24 Hrs  T(C): 37.4 (07 Dec 2017 00:09), Max: 37.6 (06 Dec 2017 22:00)  T(F): 99.3 (07 Dec 2017 00:09), Max: 99.7 (06 Dec 2017 22:00)  HR: 92 (07 Dec 2017 00:09) (68 - 103)  BP: 130/84 (07 Dec 2017 00:09) (119/73 - 134/82)  BP(mean): 99 (06 Dec 2017 17:00) (91 - 102)  RR: 18 (07 Dec 2017 00:09) (16 - 20)  SpO2: 96% (07 Dec 2017 00:09) (93% - 99%)  Height (cm): 154.94 (12-06 @ 09:03)  Weight (kg): 84.4 (12-06 @ 08:23)  BMI (kg/m2): 35.2 (12-06 @ 09:03)  BSA (m2): 1.83 (12-06 @ 09:03)  I&O's Summary    06 Dec 2017 07:01  -  07 Dec 2017 03:24  --------------------------------------------------------  IN: 450 mL / OUT: 1100 mL / NET: -650 mL      I&O's Detail    06 Dec 2017 07:01  -  07 Dec 2017 03:24  --------------------------------------------------------  IN:    lactated ringers.: 450 mL  Total IN: 450 mL    OUT:    Indwelling Catheter - Urethral: 600 mL    Voided: 500 mL  Total OUT: 1100 mL    Total NET: -650 mL          Physical Exam:    General:  Appears stated age, well-groomed, well-nourished, no distress  Chest:  clear breath sounds  Cardiovascular:  Regular rate & rhythm  Abdomen:  Soft, incisions clean, dry intact, tenderness around incisions, no rebound or guarding  Skin:  No rash  Neuro/Psych:  Alert, oriented to time, place and person                           13.4   11.6  )-----------( 203      ( 06 Dec 2017 14:39 )             38.1       12-06    137  |  99  |  10  ----------------------------<  178<H>  4.1   |  23  |  0.70    Ca    8.9      06 Dec 2017 14:39  Phos  2.9     12-06  Mg     2.0     12-06        acetaminophen  IVPB. milliGRAM(s) IV Intermittent once  acetaminophen  IVPB. milliGRAM(s) IV Intermittent once  acetaminophen  IVPB. milliGRAM(s) IV Intermittent once  aluminum hydroxide/magnesium hydroxide/simethicone Suspension milliLiter(s) Oral every 4 hours PRN  ceFAZolin   IVPB milliGRAM(s) IV Intermittent every 8 hours  ceFAZolin   IVPB milliGRAM(s) IV Intermittent once  heparin  Injectable Unit(s) SubCutaneous every 8 hours  hyoscyamine SL milliGRAM(s) SubLingual every 6 hours  ketorolac   Injectable milliGRAM(s) IV Push every 6 hours  lactated ringers. milliLiter(s) IV Continuous <Continuous>  ondansetron Injectable milliGRAM(s) IV Push every 6 hours PRN  pantoprazole  Injectable milliGRAM(s) IV Push daily  potassium chloride  10 mEq/100 mL IVPB milliEquivalent(s) IV Intermittent every 1 hour PRN

## 2017-12-07 NOTE — PROGRESS NOTE ADULT - SUBJECTIVE AND OBJECTIVE BOX
Patient with no anginal chest pain or shortness of breath      MEDICATIONS  (STANDING):  acetaminophen  IVPB. 1000 milliGRAM(s) IV Intermittent once  acetaminophen  IVPB. 1000 milliGRAM(s) IV Intermittent once  ceFAZolin   IVPB 2000 milliGRAM(s) IV Intermittent once  heparin  Injectable 5000 Unit(s) SubCutaneous every 8 hours  hyoscyamine SL 0.125 milliGRAM(s) SubLingual every 6 hours  ketorolac   Injectable 30 milliGRAM(s) IV Push every 6 hours  lactated ringers. 1000 milliLiter(s) (150 mL/Hr) IV Continuous <Continuous>  pantoprazole  Injectable 40 milliGRAM(s) IV Push daily    MEDICATIONS  (PRN):  aluminum hydroxide/magnesium hydroxide/simethicone Suspension 30 milliLiter(s) Oral every 4 hours PRN Dyspepsia  ondansetron Injectable 4 milliGRAM(s) IV Push every 6 hours PRN Nausea  potassium chloride  10 mEq/100 mL IVPB 10 milliEquivalent(s) IV Intermittent every 1 hour PRN IF post-operative potassium is LESS THEN 3.5 milliMole(s)/L      LABS:                        12.7   9.8   )-----------( 212      ( 07 Dec 2017 05:40 )             36.9       12-07    136  |  103  |  8   ----------------------------<  95  3.7   |  23  |  0.61    Ca    8.5      07 Dec 2017 05:40  Phos  2.9     12-06  Mg     2.0     12-07      PHYSICAL EXAM:  Vital Signs Last 24 Hrs  T(C): 36.9 (07 Dec 2017 08:21), Max: 37.6 (06 Dec 2017 22:00)  T(F): 98.4 (07 Dec 2017 08:21), Max: 99.7 (06 Dec 2017 22:00)  HR: 80 (07 Dec 2017 08:21) (66 - 103)  BP: 117/74 (07 Dec 2017 08:21) (117/74 - 134/87)  BP(mean): 99 (06 Dec 2017 17:00) (91 - 102)  RR: 18 (07 Dec 2017 08:21) (16 - 20)  SpO2: 96% (07 Dec 2017 08:21) (93% - 99%)    HEENT: Normal Oral mucosa, PERRL, EOMI  Lymphatic: No obvious lymphadenopathy, No edema  Cardiovascular: Normal S1S2, No JVD, 1/6 OLRIN, Peripheral pulses palpable 2+ B/L  Respiratory: Lungs clear to auscultation, normal effort  Gastrointestinal: Abdomen soft, ND, NT, +BS  Skin: Warm, dry, intact. No cyanosis, No rash.  Musculoskeletal: Normal ROM, normal strength  Psychiatric: Appropriate Mood and Affect      DIAGNOSTIC DATA  TELEMETRY: n/a    RADIOLOGY: n/a     ASSESSMENT AND PLAN: This is a very pleasant 42 year old female with h/o HTN (currently off anti-hypertensives) , obesity  DM, PCOS, well known to our office with no h/o CAD with historically NL LV function on TTE and no ischemia on stress echo who is post op day #0 gastric Gastric band and port removal with sleeve gastrectomy. She tolerated the procedure well and currently has no anginal symptoms.     -pt tolerated procedure well   - BP well controlled  - HD stable  - care per primary team  - upon DC will arrange outpt f/u with her cardiologist Dr. Leyad Jamil [facility]  O:  [phone]  P: [phone]

## 2017-12-07 NOTE — DISCHARGE NOTE ADULT - MEDICATION SUMMARY - MEDICATIONS TO TAKE
I will START or STAY ON the medications listed below when I get home from the hospital:    oxyCODONE 5 mg/5 mL oral solution  -- 5 milliliter(s) by mouth every 4 hours MDD:20 mL  -- Caution federal law prohibits the transfer of this drug to any person other  than the person for whom it was prescribed.  May cause drowsiness.  Alcohol may intensify this effect.  Use care when operating dangerous machinery.  This prescription cannot be refilled.  Using more of this medication than prescribed may cause serious breathing problems.    -- Indication: For Post operative pain    ondansetron 4 mg oral tablet, disintegrating  -- 1 tab(s) by mouth every 6 hours MDD:4 tabs  -- Indication: For nasuea    hyoscyamine 0.125 mg sublingual tablet  -- 1 tab(s) under tongue every 6 hours MDD:4 tabs  -- Indication: For GI function    omeprazole 40 mg oral delayed release capsule  -- 1 cap(s) by mouth once a day MDD:1 tab, open capsule and mix with applesauce  -- It is very important that you take or use this exactly as directed.  Do not skip doses or discontinue unless directed by your doctor.  Obtain medical advice before taking any non-prescription drugs as some may affect the action of this medication.  Swallow whole.  Do not crush.    -- Indication: For GERD    multivitamin  -- 1 tab(s) by mouth once a day  -- Indication: For Supplement    Vitamin D3 2000 intl units oral tablet  -- 1 tab(s) by mouth once a day  -- Indication: For Supplement    biotin 5000 mcg oral tablet, disintegrating  -- 1 tab(s) by mouth once a day  -- Indication: For Supplement

## 2017-12-07 NOTE — DISCHARGE NOTE ADULT - ADDITIONAL INSTRUCTIONS
follow-up with Dr. Dean in 1-2 weeks.  She was also advised to follow-up with her primary medical doctor in 1-2 weeks as well as his nutritionist. Follow up with Dr. Crabtree, your cardiologist, regarding your recent hospitalization and your history of CAD.

## 2017-12-07 NOTE — DISCHARGE NOTE ADULT - INSTRUCTIONS
Continue bariatric clear liquid today and begin full liquid diet as instructed tomorrow for a period fo 2 weeks then advance to soft/puree

## 2017-12-07 NOTE — DISCHARGE NOTE ADULT - NS AS DC STROKE ED MATERIALS
Stroke Warning Signs and Symptoms/Risk Factors for Stroke/Call 911 for Stroke/Need for Followup After Discharge/Prescribed Medications/Stroke Education Booklet

## 2017-12-07 NOTE — DISCHARGE NOTE ADULT - CARE PROVIDER_API CALL
Cooper Miner (MD), Surgery  [address] [facility]  [address] [place] [id number]  Phone: [phone]  Fax: [phone]

## 2017-12-07 NOTE — DISCHARGE NOTE ADULT - NS AS ACTIVITY OBS
Showering allowed/No Heavy lifting/straining Walking-Indoors allowed/Showering allowed/Walking-Outdoors allowed/No Heavy lifting/straining

## 2017-12-07 NOTE — PROGRESS NOTE ADULT - SUBJECTIVE AND OBJECTIVE BOX
Post Op Day#: 1    Subjective: Feels good, No abdominal pain, mild soreness. .    Objective: OOB ambulated with assistance. Afebrile, v/s stable. Tolerating bariatric liquid. Denies nausea and vomiting Voided                                               Vital Signs Last 24 Hrs  T(C): 36.7 (07 Dec 2017 13:54), Max: 37.6 (06 Dec 2017 22:00)  T(F): 98.1 (07 Dec 2017 13:54), Max: 99.7 (06 Dec 2017 22:00)  HR: 86 (07 Dec 2017 13:54) (66 - 103)  BP: 112/75 (07 Dec 2017 13:54) (112/75 - 134/87)  BP(mean): 99 (06 Dec 2017 17:00) (95 - 100)  RR: 18 (07 Dec 2017 13:54) (16 - 18)  SpO2: 96% (07 Dec 2017 13:54) (93% - 99%)                                               I&O's Summary    06 Dec 2017 07:01  -  07 Dec 2017 07:00  --------------------------------------------------------  IN: 2350 mL / OUT: 1900 mL / NET: 450 mL    07 Dec 2017 07:01  -  07 Dec 2017 15:26  --------------------------------------------------------  IN: 360 mL / OUT: 0 mL / NET: 360 mL  oral 360ml+                                                                        12.7   9.8   )-----------( 212      ( 07 Dec 2017 05:40 )             36.9                                                 12-07    136  |  103  |  8   ----------------------------<  95  3.7   |  23  |  0.61    Ca    8.5      07 Dec 2017 05:40  Phos  2.9     12-06  Mg     2.0     12-06        Physical Exam:         Lungs:  clear breath sounds b/l       Heart:  Regular rate & rhythm       Abdomen:  Soft, non-distended.  Scopes sites clean, dry and intact. + bs, - flatus, no rebound or guarding       Skin:  No rash       Extremities: + pulses, no edema, no calf tenderness, negative hiral's                Caprini VTE Risk Score:  3-4(moderate ) No extended prophylaxis required post-discharge  [facility]ISK [place]ORE:  12 (low) No extended prophylaxis recommended post-discharge    Assessment and Plan: S/P lap Removal of AGB converted to Sleeve Gastrectomy    - Bariatric Clear diet today and then advanced to protocol derived staged meal progression supervised by RD  - GI prophylaxis, Incentive spirometry  - Ambulation at least every 3 hours  -  Procedure specific education including diet, vitamins and VTE prevention. Written materials given  - Medication reviewed and reconciled, Bariatric meds obtained from [facility]  -  Met Bariatric 8-Point d/c criteria, d/c home  -  Follow up with Dr Miner in 7-10 days, Dietitian and PMD in 30 days.      Hanna Fischer, DAV, ANP  [phone]

## 2017-12-07 NOTE — DIETITIAN INITIAL EVALUATION ADULT. - OTHER INFO
Consult received for bariatric surgery. Per chart pt has tried multiple wt loss attempts. Per outpatient RD note on 8/7/17, pt had lap band surgery in 2011 and lost almost 60 pounds and gained back 40 pounds. Pt was unable to see results after 2 years of lap band surgery and suffered from nausea/vomiting and port pain. Pt reports weighing 194 pounds before going on the 2 week full liquid diet and her presurgical wt was 186 pounds. Pt now S/P laparoscopic vertical sleeve gastrectomy. Pt denies nausea/vomiting, tolerating clear liquid bariatric diet at time of visit. Pt with knowledge of bariatric full liquid diet however receptive to in depth review/reinforcement. Pt states having protein shakes at home. Pt states she also purchased some of the necessary vitamins. Pt was advised to purchase chewable/liquid multivitamin with added elemental iron, chewable/liquid calcium citrate with vitamin D and sublingual B12. Pt was advised to take the chewable/liquid multivitamin with added elemental iron at least 2 hours apart from the chewable/liquid calcium citrate with vitamin D for optimal absorption. Pt states she plans to follow up with outpatient RD. Pt able to teach back all points discussed during interview.

## 2017-12-07 NOTE — DISCHARGE NOTE ADULT - CARE PLAN
Principal Discharge DX:	Morbid Obesity  Goal:	recovery from surgery  Instructions for follow-up, activity and diet:	WOUND CARE:  Please keep incisions clean and dry. Please do not Scrub or rub incisions. Do not use lotion or powder on incisions.   BATHING: Please do not submerge wound underwater. You may shower and/or sponge bathe.  ACTIVITY: No heavy lifting or straining. Otherwise, you may return to your usual level of physical activity. If you are taking narcotic pain medication (such as Oxycodone) DO NOT drive a car, operate machinery or make important decisions.  DIET: Continue bariatric clear liquid today and begin full liquid diet as instructed tomorrow for a period fo 2 weeks then advance to soft/puree  NOTIFY YOUR SURGEON IF: You have any bleeding that does not stop, any pus draining from your wound(s), any fever (over 100.4 F) or chills, persistent nausea/vomiting, persistent diarrhea, or if your pain is not controlled on your discharge pain medications.  FOLLOW-UP:  follow-up with Dr. Dean in 1-2 weeks.  She was also advised to follow-up with her primary medical doctor in 1-2 weeks as well as his nutritionist. Follow up with Dr. Crabtree, your cardiologist, regarding your recent hospitalization and your history of CAD. Principal Discharge DX:	Morbid Obesity  Goal:	recovery from surgery and practices healthy lifestyle changes for improve quality fo life  Instructions for follow-up, activity and diet:	WOUND CARE:  Please keep incisions clean and dry. Please do not Scrub or rub incisions. Do not use lotion or powder on incisions.   BATHING: Please do not submerge wound underwater. You may shower and/or sponge bathe.  ACTIVITY: No heavy lifting or straining. Otherwise, you may return to your usual level of physical activity. If you are taking narcotic pain medication (such as Oxycodone) DO NOT drive a car, operate machinery or make important decisions.  DIET: Continue bariatric clear liquid today and begin full liquid diet as instructed tomorrow for a period fo 2 weeks then advance to soft/puree  NOTIFY YOUR SURGEON IF: You have any bleeding that does not stop, any pus draining from your wound(s), any fever (over 100.4 F) or chills, persistent nausea/vomiting, persistent diarrhea, or if your pain is not controlled on your discharge pain medications.  FOLLOW-UP:  follow-up with Dr. Dean in 1-2 weeks.  She was also advised to follow-up with her primary medical doctor in 1-2 weeks as well as his nutritionist. Follow up with Dr. Crabtree, your cardiologist, regarding your recent hospitalization and your history of CAD.

## 2017-12-07 NOTE — DIETITIAN INITIAL EVALUATION ADULT. - ADHERENCE
Pt reports following a full liquid diet for 2 weeks PTA as instructed by outpatient RD. Pt reports having bone broth, Premier protein shakes and sugar free pudding.

## 2017-12-12 LAB — SURGICAL PATHOLOGY STUDY: SIGNIFICANT CHANGE UP

## 2017-12-15 ENCOUNTER — APPOINTMENT (OUTPATIENT)
Dept: SURGERY | Facility: CLINIC | Age: 42
End: 2017-12-15
Payer: MEDICAID

## 2017-12-15 VITALS
SYSTOLIC BLOOD PRESSURE: 118 MMHG | HEART RATE: 85 BPM | HEIGHT: 61.5 IN | TEMPERATURE: 98.7 F | RESPIRATION RATE: 16 BRPM | WEIGHT: 194 LBS | BODY MASS INDEX: 36.16 KG/M2 | DIASTOLIC BLOOD PRESSURE: 81 MMHG | OXYGEN SATURATION: 98 %

## 2017-12-15 VITALS — HEIGHT: 61.5 IN | WEIGHT: 178 LBS | BODY MASS INDEX: 33.18 KG/M2

## 2017-12-15 DIAGNOSIS — T78.40XA ALLERGY, UNSPECIFIED, INITIAL ENCOUNTER: ICD-10-CM

## 2017-12-15 PROCEDURE — 99024 POSTOP FOLLOW-UP VISIT: CPT

## 2017-12-15 RX ORDER — METFORMIN HYDROCHLORIDE 500 MG/1
500 TABLET, COATED ORAL
Qty: 60 | Refills: 4 | Status: DISCONTINUED | COMMUNITY
Start: 2017-08-25 | End: 2017-12-15

## 2017-12-15 RX ORDER — CLOBETASOL PROPIONATE 0.5 MG/G
0.05 OINTMENT TOPICAL TWICE DAILY
Qty: 1 | Refills: 0 | Status: DISCONTINUED | OUTPATIENT
Start: 2017-12-15 | End: 2017-12-15

## 2018-01-19 ENCOUNTER — APPOINTMENT (OUTPATIENT)
Dept: SURGERY | Facility: CLINIC | Age: 43
End: 2018-01-19
Payer: MEDICAID

## 2018-01-19 VITALS
DIASTOLIC BLOOD PRESSURE: 90 MMHG | HEART RATE: 87 BPM | BODY MASS INDEX: 32.06 KG/M2 | WEIGHT: 172 LBS | RESPIRATION RATE: 15 BRPM | TEMPERATURE: 98.2 F | OXYGEN SATURATION: 98 % | HEIGHT: 61.5 IN | SYSTOLIC BLOOD PRESSURE: 138 MMHG

## 2018-01-19 DIAGNOSIS — E66.9 OBESITY, UNSPECIFIED: ICD-10-CM

## 2018-01-19 DIAGNOSIS — Z87.898 PERSONAL HISTORY OF OTHER SPECIFIED CONDITIONS: ICD-10-CM

## 2018-01-19 PROCEDURE — 99024 POSTOP FOLLOW-UP VISIT: CPT

## 2018-01-19 RX ORDER — CLOBETASOL PROPIONATE 0.5 MG/G
0.05 OINTMENT TOPICAL TWICE DAILY
Qty: 1 | Refills: 0 | Status: DISCONTINUED | COMMUNITY
Start: 2017-12-16 | End: 2018-01-19

## 2018-01-22 ENCOUNTER — TRANSCRIPTION ENCOUNTER (OUTPATIENT)
Age: 43
End: 2018-01-22

## 2018-02-08 ENCOUNTER — APPOINTMENT (OUTPATIENT)
Dept: DERMATOLOGY | Facility: CLINIC | Age: 43
End: 2018-02-08
Payer: MEDICAID

## 2018-02-08 ENCOUNTER — APPOINTMENT (OUTPATIENT)
Dept: DERMATOLOGY | Facility: CLINIC | Age: 43
End: 2018-02-08

## 2018-02-08 VITALS
TEMPERATURE: 98.1 F | BODY MASS INDEX: 32.43 KG/M2 | SYSTOLIC BLOOD PRESSURE: 134 MMHG | HEIGHT: 61.5 IN | OXYGEN SATURATION: 99 % | WEIGHT: 174 LBS | DIASTOLIC BLOOD PRESSURE: 91 MMHG | HEART RATE: 76 BPM

## 2018-02-08 DIAGNOSIS — Z12.83 ENCOUNTER FOR SCREENING FOR MALIGNANT NEOPLASM OF SKIN: ICD-10-CM

## 2018-02-08 DIAGNOSIS — L85.8 OTHER SPECIFIED EPIDERMAL THICKENING: ICD-10-CM

## 2018-02-08 DIAGNOSIS — L65.9 NONSCARRING HAIR LOSS, UNSPECIFIED: ICD-10-CM

## 2018-02-08 DIAGNOSIS — D22.9 MELANOCYTIC NEVI, UNSPECIFIED: ICD-10-CM

## 2018-02-08 DIAGNOSIS — L21.9 SEBORRHEIC DERMATITIS, UNSPECIFIED: ICD-10-CM

## 2018-02-08 DIAGNOSIS — L74.519 PRIMARY FOCAL HYPERHIDROSIS, UNSPECIFIED: ICD-10-CM

## 2018-02-08 PROCEDURE — 99213 OFFICE O/P EST LOW 20 MIN: CPT

## 2018-02-09 ENCOUNTER — APPOINTMENT (OUTPATIENT)
Dept: DERMATOLOGY | Facility: CLINIC | Age: 43
End: 2018-02-09

## 2018-03-13 ENCOUNTER — TRANSCRIPTION ENCOUNTER (OUTPATIENT)
Age: 43
End: 2018-03-13

## 2018-03-20 ENCOUNTER — APPOINTMENT (OUTPATIENT)
Dept: OBGYN | Facility: CLINIC | Age: 43
End: 2018-03-20
Payer: MEDICAID

## 2018-03-20 VITALS
DIASTOLIC BLOOD PRESSURE: 80 MMHG | BODY MASS INDEX: 31.53 KG/M2 | HEIGHT: 61 IN | OXYGEN SATURATION: 98 % | HEART RATE: 100 BPM | WEIGHT: 167 LBS | SYSTOLIC BLOOD PRESSURE: 120 MMHG

## 2018-03-20 DIAGNOSIS — Z12.31 ENCOUNTER FOR SCREENING MAMMOGRAM FOR MALIGNANT NEOPLASM OF BREAST: ICD-10-CM

## 2018-03-20 DIAGNOSIS — R92.2 INCONCLUSIVE MAMMOGRAM: ICD-10-CM

## 2018-03-20 PROCEDURE — 99396 PREV VISIT EST AGE 40-64: CPT

## 2018-03-20 RX ORDER — ALUMINUM CHLORIDE 20 %
20 SOLUTION, NON-ORAL TOPICAL
Qty: 100 | Refills: 2 | Status: DISCONTINUED | COMMUNITY
Start: 2018-02-08 | End: 2018-03-20

## 2018-03-20 RX ORDER — KETOCONAZOLE 20.5 MG/ML
2 SHAMPOO, SUSPENSION TOPICAL
Qty: 1 | Refills: 11 | Status: DISCONTINUED | COMMUNITY
Start: 2018-02-08 | End: 2018-03-20

## 2018-03-22 LAB
C TRACH RRNA SPEC QL NAA+PROBE: NOT DETECTED
HPV HIGH+LOW RISK DNA PNL CVX: NOT DETECTED
N GONORRHOEA RRNA SPEC QL NAA+PROBE: NOT DETECTED
SOURCE TP AMPLIFICATION: NORMAL

## 2018-03-23 ENCOUNTER — APPOINTMENT (OUTPATIENT)
Dept: SURGERY | Facility: CLINIC | Age: 43
End: 2018-03-23

## 2018-03-27 ENCOUNTER — FORM ENCOUNTER (OUTPATIENT)
Age: 43
End: 2018-03-27

## 2018-03-27 LAB — CYTOLOGY CVX/VAG DOC THIN PREP: NORMAL

## 2018-03-28 ENCOUNTER — APPOINTMENT (OUTPATIENT)
Dept: ULTRASOUND IMAGING | Facility: CLINIC | Age: 43
End: 2018-03-28
Payer: MEDICAID

## 2018-03-28 ENCOUNTER — APPOINTMENT (OUTPATIENT)
Dept: MAMMOGRAPHY | Facility: CLINIC | Age: 43
End: 2018-03-28
Payer: MEDICAID

## 2018-03-28 ENCOUNTER — OUTPATIENT (OUTPATIENT)
Dept: OUTPATIENT SERVICES | Facility: HOSPITAL | Age: 43
LOS: 1 days | End: 2018-03-28
Payer: MEDICAID

## 2018-03-28 DIAGNOSIS — Z00.8 ENCOUNTER FOR OTHER GENERAL EXAMINATION: ICD-10-CM

## 2018-03-28 DIAGNOSIS — R92.2 INCONCLUSIVE MAMMOGRAM: ICD-10-CM

## 2018-03-28 DIAGNOSIS — Z98.84 BARIATRIC SURGERY STATUS: Chronic | ICD-10-CM

## 2018-03-28 PROCEDURE — 76641 ULTRASOUND BREAST COMPLETE: CPT | Mod: 26,50

## 2018-03-28 PROCEDURE — 77067 SCR MAMMO BI INCL CAD: CPT

## 2018-03-28 PROCEDURE — 77063 BREAST TOMOSYNTHESIS BI: CPT | Mod: 26

## 2018-03-28 PROCEDURE — 77063 BREAST TOMOSYNTHESIS BI: CPT

## 2018-03-28 PROCEDURE — 77067 SCR MAMMO BI INCL CAD: CPT | Mod: 26

## 2018-03-28 PROCEDURE — 76641 ULTRASOUND BREAST COMPLETE: CPT

## 2018-05-10 ENCOUNTER — APPOINTMENT (OUTPATIENT)
Dept: HUMAN REPRODUCTION | Facility: CLINIC | Age: 43
End: 2018-05-10
Payer: SELF-PAY

## 2018-05-10 VITALS
SYSTOLIC BLOOD PRESSURE: 125 MMHG | WEIGHT: 168 LBS | BODY MASS INDEX: 31.74 KG/M2 | OXYGEN SATURATION: 97 % | RESPIRATION RATE: 18 BRPM | DIASTOLIC BLOOD PRESSURE: 85 MMHG | TEMPERATURE: 97.8 F | HEART RATE: 82 BPM

## 2018-05-10 PROCEDURE — 99214 OFFICE O/P EST MOD 30 MIN: CPT

## 2018-09-20 ENCOUNTER — APPOINTMENT (OUTPATIENT)
Dept: DERMATOLOGY | Facility: CLINIC | Age: 43
End: 2018-09-20
Payer: MEDICAID

## 2018-09-20 VITALS
TEMPERATURE: 98.7 F | SYSTOLIC BLOOD PRESSURE: 131 MMHG | HEART RATE: 82 BPM | OXYGEN SATURATION: 96 % | WEIGHT: 163 LBS | HEIGHT: 61 IN | DIASTOLIC BLOOD PRESSURE: 86 MMHG | BODY MASS INDEX: 30.78 KG/M2

## 2018-09-20 DIAGNOSIS — L82.1 OTHER SEBORRHEIC KERATOSIS: ICD-10-CM

## 2018-09-20 PROCEDURE — 99213 OFFICE O/P EST LOW 20 MIN: CPT

## 2018-10-01 PROBLEM — L82.1 SEBORRHEIC KERATOSIS: Status: ACTIVE | Noted: 2018-10-01

## 2019-02-20 ENCOUNTER — APPOINTMENT (OUTPATIENT)
Dept: PULMONOLOGY | Facility: CLINIC | Age: 44
End: 2019-02-20

## 2019-05-09 ENCOUNTER — APPOINTMENT (OUTPATIENT)
Dept: OTOLARYNGOLOGY | Facility: CLINIC | Age: 44
End: 2019-05-09
Payer: MEDICAID

## 2019-05-09 VITALS
HEIGHT: 61 IN | HEART RATE: 96 BPM | BODY MASS INDEX: 32.1 KG/M2 | SYSTOLIC BLOOD PRESSURE: 137 MMHG | DIASTOLIC BLOOD PRESSURE: 89 MMHG | WEIGHT: 170 LBS

## 2019-05-09 DIAGNOSIS — J34.89 OTHER SPECIFIED DISORDERS OF NOSE AND NASAL SINUSES: ICD-10-CM

## 2019-05-09 DIAGNOSIS — R09.82 POSTNASAL DRIP: ICD-10-CM

## 2019-05-09 DIAGNOSIS — R49.0 DYSPHONIA: ICD-10-CM

## 2019-05-09 DIAGNOSIS — J30.89 OTHER ALLERGIC RHINITIS: ICD-10-CM

## 2019-05-09 DIAGNOSIS — J30.2 OTHER ALLERGIC RHINITIS: ICD-10-CM

## 2019-05-09 DIAGNOSIS — J30.0 VASOMOTOR RHINITIS: ICD-10-CM

## 2019-05-09 DIAGNOSIS — R09.81 NASAL CONGESTION: ICD-10-CM

## 2019-05-09 PROCEDURE — 31231 NASAL ENDOSCOPY DX: CPT

## 2019-05-09 PROCEDURE — 99204 OFFICE O/P NEW MOD 45 MIN: CPT | Mod: 25

## 2019-05-09 NOTE — HISTORY OF PRESENT ILLNESS
[de-identified] : 44 y/o female with over 20 year history of nasal obstruction/congestion and PND. She's had 2 prior nasal surgeries \par Deviated septum repair and turbinate reduction in 2005\par Rhinoplasty in 2019\par Feels breathing has improved after nasal surgeries however, still with PND, nasal or muffled speech, intermittent sinus pressure/pain and intermittent nasal congestion. Prior allergy testing positive to dust mites, ragweed, pet dander. Has tried Flonase in the past which helps, still uses PRN with saline irrigation PRN. She is also concerned about a crust in the right nostril which she feels with a q-tip when she cleans her nose. No runny nose. \par \par + Throat clearing. No throat pain or difficulty swallowing.\par \par \par \par \par  [FreeTextEntry1] : \par \par

## 2019-05-09 NOTE — ASSESSMENT
[FreeTextEntry1] : pt with PND and globus\par atrovent\par \par right nasal congestion s/p rhino - scar band right septum to lateral wall - offered to lyse - will see plastic surgeon first \par irrigation\par allergy avoidance\par nasal precautions\par irrigation \par \par \par \par I personally saw and examined CHARLOTTE WARD in detail. I spoke to JERRY DIAZ regarding the assessment and plan of care.  I preformed the procedures and I reviewed the above assessment and plan of care, and agree. I have made changes in changes in the body of the note where appropriate.\par

## 2019-05-09 NOTE — PROCEDURE
[FreeTextEntry6] : Procedure performed: Nasal Endoscopy- Diagnostic\par Pre / post -procedure indication(s): nasal congestion\par Verbal and/or written consent obtained from patient\par Scope #:19 ,  flexible fiber optic telescope used\par The scope was introduced in the nasal passage between the middle and inferior turbinates to exam the inferior portion of the middle meatus and the fontanelle, as well as the maxillary ostia.  Next, the scope was passed medically and posteriorly to the middle turbinates to examine the sphenoethmoid recess and the superior turbinate region.\par Upon visualization the finders are as follows:\par Nasal Septum: septum midline- scar band on right\par B/L: Mucosa: pink and moist, Mucous: scant, Polyp: not seen, Inferior Turbinate, Middle and Superior Turbinate: normal, Inferior Meatus: narrow, Middle Meatus: narrow, Super Meatus:normal, Sphenoethmoidal Recess: clear.  Eustachian tube clear. \par The patient tolerated the procedure well\par  [de-identified] : Procedure performed: laryngeal Endoscopy- Diagnostic\par Pre-op/post op indication: dysphonia\par Verbal and/or written consent obtained from patient, Patient was unable to cooperate with mirror\par Scope #: 19, flexible fiber optic telescope used \par Scope was introduced through the nose passed on the floor of the nose to the nasopharynx and then followed down the soft palate to the lower pharynx. The tongue Base, Larynx, Hypopharynx were examined. Base of tongue was symmetric, vallecular was clear, epiglottis was not deformed, subglottis/ pyriform and posterior pharyngeal walls were clear. No erythema, edema, pooling of secretions, masses or lesions. Airway patent, no foreign body visualized. No glottic/supraglottic edema. VF clear arytenoids, vestibular folds, ventricles, pyriform sinuses, and aryepiglottic folds appear normal bilaterally. Vocal cords mobile with good contact b/l.\par \par

## 2019-05-09 NOTE — CONSULT LETTER
[FreeTextEntry1] : Dear Dr. ANU MOY \par I had the pleasure of evaluating your patient JESUS WEST  thank you for allowing us to participate in their care. please see full note detailing our visit below.\par If you have any questions, please do not hesitate to call me and I would be happy to discuss further. \par \par David Mcagrry M.D.\par Attending Physician,  \par Department of Otolaryngology - Head and Neck Surgery\par [facility] \par Office: [phone]\par Fax: [phone]\par

## 2019-05-17 ENCOUNTER — APPOINTMENT (OUTPATIENT)
Dept: SURGERY | Facility: CLINIC | Age: 44
End: 2019-05-17

## 2019-10-18 ENCOUNTER — EMERGENCY (EMERGENCY)
Facility: HOSPITAL | Age: 44
LOS: 1 days | Discharge: HOME | End: 2019-10-18
Attending: EMERGENCY MEDICINE
Payer: MEDICAID

## 2019-10-18 DIAGNOSIS — Z98.84 BARIATRIC SURGERY STATUS: Chronic | ICD-10-CM

## 2019-10-18 PROCEDURE — 99285 EMERGENCY DEPT VISIT HI MDM: CPT

## 2019-10-19 VITALS
TEMPERATURE: 98 F | SYSTOLIC BLOOD PRESSURE: 122 MMHG | HEART RATE: 80 BPM | HEIGHT: 62 IN | OXYGEN SATURATION: 98 % | RESPIRATION RATE: 16 BRPM | WEIGHT: 179.9 LBS | DIASTOLIC BLOOD PRESSURE: 86 MMHG

## 2019-10-19 LAB
ALBUMIN SERPL ELPH-MCNC: 3.6 G/DL — SIGNIFICANT CHANGE UP (ref 3.5–5)
ALP SERPL-CCNC: 54 U/L — SIGNIFICANT CHANGE UP (ref 40–120)
ALT FLD-CCNC: 20 U/L DA — SIGNIFICANT CHANGE UP (ref 10–60)
ANION GAP SERPL CALC-SCNC: 5 MMOL/L — SIGNIFICANT CHANGE UP (ref 5–17)
AST SERPL-CCNC: 14 U/L — SIGNIFICANT CHANGE UP (ref 10–40)
BILIRUB SERPL-MCNC: 0.3 MG/DL — SIGNIFICANT CHANGE UP (ref 0.2–1.2)
BUN SERPL-MCNC: 12 MG/DL — SIGNIFICANT CHANGE UP (ref 7–18)
CALCIUM SERPL-MCNC: 9.1 MG/DL — SIGNIFICANT CHANGE UP (ref 8.4–10.5)
CHLORIDE SERPL-SCNC: 107 MMOL/L — SIGNIFICANT CHANGE UP (ref 96–108)
CO2 SERPL-SCNC: 26 MMOL/L — SIGNIFICANT CHANGE UP (ref 22–31)
CREAT SERPL-MCNC: 0.89 MG/DL — SIGNIFICANT CHANGE UP (ref 0.5–1.3)
GLUCOSE SERPL-MCNC: 115 MG/DL — HIGH (ref 70–99)
HCG SERPL-ACNC: <1 MIU/ML — SIGNIFICANT CHANGE UP
HCT VFR BLD CALC: 39 % — SIGNIFICANT CHANGE UP (ref 34.5–45)
HGB BLD-MCNC: 13.1 G/DL — SIGNIFICANT CHANGE UP (ref 11.5–15.5)
MCHC RBC-ENTMCNC: 29.8 PG — SIGNIFICANT CHANGE UP (ref 27–34)
MCHC RBC-ENTMCNC: 33.6 GM/DL — SIGNIFICANT CHANGE UP (ref 32–36)
MCV RBC AUTO: 88.8 FL — SIGNIFICANT CHANGE UP (ref 80–100)
NRBC # BLD: 0 /100 WBCS — SIGNIFICANT CHANGE UP (ref 0–0)
PLATELET # BLD AUTO: 215 K/UL — SIGNIFICANT CHANGE UP (ref 150–400)
POTASSIUM SERPL-MCNC: 3.7 MMOL/L — SIGNIFICANT CHANGE UP (ref 3.5–5.3)
POTASSIUM SERPL-SCNC: 3.7 MMOL/L — SIGNIFICANT CHANGE UP (ref 3.5–5.3)
PROT SERPL-MCNC: 7.7 G/DL — SIGNIFICANT CHANGE UP (ref 6–8.3)
RBC # BLD: 4.39 M/UL — SIGNIFICANT CHANGE UP (ref 3.8–5.2)
RBC # FLD: 12.3 % — SIGNIFICANT CHANGE UP (ref 10.3–14.5)
SODIUM SERPL-SCNC: 138 MMOL/L — SIGNIFICANT CHANGE UP (ref 135–145)
WBC # BLD: 6.28 K/UL — SIGNIFICANT CHANGE UP (ref 3.8–10.5)
WBC # FLD AUTO: 6.28 K/UL — SIGNIFICANT CHANGE UP (ref 3.8–10.5)

## 2019-10-19 PROCEDURE — 96374 THER/PROPH/DIAG INJ IV PUSH: CPT

## 2019-10-19 PROCEDURE — 96375 TX/PRO/DX INJ NEW DRUG ADDON: CPT

## 2019-10-19 PROCEDURE — 84702 CHORIONIC GONADOTROPIN TEST: CPT

## 2019-10-19 PROCEDURE — 85027 COMPLETE CBC AUTOMATED: CPT

## 2019-10-19 PROCEDURE — 99284 EMERGENCY DEPT VISIT MOD MDM: CPT | Mod: 25

## 2019-10-19 PROCEDURE — 36415 COLL VENOUS BLD VENIPUNCTURE: CPT

## 2019-10-19 PROCEDURE — 80053 COMPREHEN METABOLIC PANEL: CPT

## 2019-10-19 RX ORDER — METOCLOPRAMIDE HCL 10 MG
10 TABLET ORAL ONCE
Refills: 0 | Status: COMPLETED | OUTPATIENT
Start: 2019-10-19 | End: 2019-10-19

## 2019-10-19 RX ORDER — DIPHENHYDRAMINE HCL 50 MG
25 CAPSULE ORAL ONCE
Refills: 0 | Status: COMPLETED | OUTPATIENT
Start: 2019-10-19 | End: 2019-10-19

## 2019-10-19 RX ORDER — KETOROLAC TROMETHAMINE 30 MG/ML
15 SYRINGE (ML) INJECTION ONCE
Refills: 0 | Status: DISCONTINUED | OUTPATIENT
Start: 2019-10-19 | End: 2019-10-19

## 2019-10-19 RX ADMIN — Medication 10 MILLIGRAM(S): at 01:08

## 2019-10-19 RX ADMIN — Medication 15 MILLIGRAM(S): at 01:07

## 2019-10-19 RX ADMIN — Medication 25 MILLIGRAM(S): at 01:07

## 2019-10-19 NOTE — ED PROVIDER NOTE - PATIENT PORTAL LINK FT
You can access the FollowMyHealth Patient Portal offered by [facility] by registering at the following website: [email or web address]. By joining [employer]’s FollowMyHealth portal, you will also be able to view your health information using other applications (apps) compatible with our system.

## 2019-10-19 NOTE — ED PROVIDER NOTE - PROGRESS NOTE DETAILS
labs - wnl  Feeling much better after meds. Repeat neuro exam unchanged. Will dc with neuro fu. Discussed indications for patient return to ED. Patient understood.

## 2019-10-19 NOTE — ED PROVIDER NOTE - OBJECTIVE STATEMENT
42 yo F with remote hx of migraine presents with gradual onset HA, mostly occipital but also some frontal pain, x 2 days, throbbing, with nausea. Minimal response to motrin. Denies fever, trauma, neck pain or stiffness, weakness, other acute complaints.

## 2019-11-08 ENCOUNTER — TRANSCRIPTION ENCOUNTER (OUTPATIENT)
Age: 44
End: 2019-11-08

## 2019-11-15 NOTE — DISCHARGE NOTE ADULT - PATIENT PORTAL LINK FT
“You can access the FollowHealth Patient Portal, offered by [facility], by registering with the following website: [email or web address]” Billing Type: Third-Party Bill Bill For Surgical Tray: no Expected Date Of Service: 11/15/2019 Performing Laboratory: [id number]

## 2020-02-28 ENCOUNTER — APPOINTMENT (OUTPATIENT)
Dept: OBGYN | Facility: CLINIC | Age: 45
End: 2020-02-28
Payer: MEDICAID

## 2020-02-28 VITALS
SYSTOLIC BLOOD PRESSURE: 120 MMHG | HEART RATE: 80 BPM | DIASTOLIC BLOOD PRESSURE: 82 MMHG | BODY MASS INDEX: 33.99 KG/M2 | OXYGEN SATURATION: 97 % | HEIGHT: 61 IN | WEIGHT: 180 LBS

## 2020-02-28 DIAGNOSIS — N76.0 ACUTE VAGINITIS: ICD-10-CM

## 2020-02-28 DIAGNOSIS — Z87.440 PERSONAL HISTORY OF URINARY (TRACT) INFECTIONS: ICD-10-CM

## 2020-02-28 LAB
BILIRUB UR QL STRIP: NEGATIVE
CLARITY UR: CLEAR
COLLECTION METHOD: NORMAL
GLUCOSE UR-MCNC: NEGATIVE
HCG UR QL: 0.2 EU/DL
HGB UR QL STRIP.AUTO: NEGATIVE
KETONES UR-MCNC: NEGATIVE
LEUKOCYTE ESTERASE UR QL STRIP: NEGATIVE
NITRITE UR QL STRIP: NEGATIVE
PH UR STRIP: 5
PROT UR STRIP-MCNC: NEGATIVE
SP GR UR STRIP: >=1.03

## 2020-02-28 PROCEDURE — 99396 PREV VISIT EST AGE 40-64: CPT

## 2020-02-28 RX ORDER — IPRATROPIUM BROMIDE 21 UG/1
0.03 SPRAY NASAL 3 TIMES DAILY
Qty: 3 | Refills: 3 | Status: DISCONTINUED | COMMUNITY
Start: 2019-05-09 | End: 2020-02-28

## 2020-02-28 NOTE — HISTORY OF PRESENT ILLNESS
[Good] : being in good health [Reproductive Age] : is of reproductive age [HPV Vaccine NA Due to Age] : HPV vaccine not available to patient due to age [Last Mammogram ___] : Last Mammogram was [unfilled] [Last Pap ___] : Last cervical pap smear was [unfilled] [Menstrual Problems] : reports normal menses [Sexually Active] : is not sexually active

## 2020-02-28 NOTE — COUNSELING
[Nutrition] : nutrition [Breast Self Exam] : breast self exam [Exercise] : exercise [Vitamins/Supplements] : vitamins/supplements [STD (testing, results, tx)] : STD (testing, results, tx) [Contraception] : contraception [Safe Sexual Practices] : safe sexual practices

## 2020-03-01 LAB
BACTERIA UR CULT: NORMAL
CANDIDA VAG CYTO: NOT DETECTED
G VAGINALIS+PREV SP MTYP VAG QL MICRO: DETECTED
T VAGINALIS VAG QL WET PREP: NOT DETECTED

## 2020-03-09 ENCOUNTER — FORM ENCOUNTER (OUTPATIENT)
Age: 45
End: 2020-03-09

## 2020-03-10 ENCOUNTER — APPOINTMENT (OUTPATIENT)
Dept: MAMMOGRAPHY | Facility: CLINIC | Age: 45
End: 2020-03-10
Payer: MEDICAID

## 2020-03-10 ENCOUNTER — OUTPATIENT (OUTPATIENT)
Dept: OUTPATIENT SERVICES | Facility: HOSPITAL | Age: 45
LOS: 1 days | End: 2020-03-10
Payer: MEDICAID

## 2020-03-10 ENCOUNTER — APPOINTMENT (OUTPATIENT)
Dept: ULTRASOUND IMAGING | Facility: CLINIC | Age: 45
End: 2020-03-10
Payer: MEDICAID

## 2020-03-10 DIAGNOSIS — Z98.84 BARIATRIC SURGERY STATUS: Chronic | ICD-10-CM

## 2020-03-10 DIAGNOSIS — Z00.8 ENCOUNTER FOR OTHER GENERAL EXAMINATION: ICD-10-CM

## 2020-03-10 PROCEDURE — 76641 ULTRASOUND BREAST COMPLETE: CPT

## 2020-03-10 PROCEDURE — 77063 BREAST TOMOSYNTHESIS BI: CPT

## 2020-03-10 PROCEDURE — 77063 BREAST TOMOSYNTHESIS BI: CPT | Mod: 26

## 2020-03-10 PROCEDURE — 77067 SCR MAMMO BI INCL CAD: CPT | Mod: 26

## 2020-03-10 PROCEDURE — 77067 SCR MAMMO BI INCL CAD: CPT

## 2020-03-10 PROCEDURE — 76641 ULTRASOUND BREAST COMPLETE: CPT | Mod: 26,50

## 2020-03-24 DIAGNOSIS — N76.0 ACUTE VAGINITIS: ICD-10-CM

## 2020-03-24 DIAGNOSIS — B96.89 ACUTE VAGINITIS: ICD-10-CM

## 2020-04-15 ENCOUNTER — APPOINTMENT (OUTPATIENT)
Dept: UROGYNECOLOGY | Facility: CLINIC | Age: 45
End: 2020-04-15

## 2020-06-20 ENCOUNTER — TRANSCRIPTION ENCOUNTER (OUTPATIENT)
Age: 45
End: 2020-06-20

## 2020-07-07 ENCOUNTER — APPOINTMENT (OUTPATIENT)
Dept: OBGYN | Facility: CLINIC | Age: 45
End: 2020-07-07

## 2020-08-07 ENCOUNTER — TRANSCRIPTION ENCOUNTER (OUTPATIENT)
Age: 45
End: 2020-08-07

## 2020-09-03 ENCOUNTER — TRANSCRIPTION ENCOUNTER (OUTPATIENT)
Age: 45
End: 2020-09-03

## 2020-11-17 ENCOUNTER — TRANSCRIPTION ENCOUNTER (OUTPATIENT)
Age: 45
End: 2020-11-17

## 2020-11-18 ENCOUNTER — TRANSCRIPTION ENCOUNTER (OUTPATIENT)
Age: 45
End: 2020-11-18

## 2020-11-30 ENCOUNTER — TRANSCRIPTION ENCOUNTER (OUTPATIENT)
Age: 45
End: 2020-11-30

## 2020-12-23 PROBLEM — N76.0 ACUTE VAGINITIS: Status: RESOLVED | Noted: 2020-02-28 | Resolved: 2020-12-23

## 2020-12-23 PROBLEM — N76.0 BACTERIAL VAGINOSIS: Status: RESOLVED | Noted: 2020-03-24 | Resolved: 2020-12-23

## 2021-03-08 ENCOUNTER — TRANSCRIPTION ENCOUNTER (OUTPATIENT)
Age: 46
End: 2021-03-08

## 2021-04-01 ENCOUNTER — APPOINTMENT (OUTPATIENT)
Dept: OBGYN | Facility: CLINIC | Age: 46
End: 2021-04-01

## 2021-04-02 ENCOUNTER — NON-APPOINTMENT (OUTPATIENT)
Age: 46
End: 2021-04-02

## 2021-04-06 ENCOUNTER — TRANSCRIPTION ENCOUNTER (OUTPATIENT)
Age: 46
End: 2021-04-06

## 2021-04-22 ENCOUNTER — APPOINTMENT (OUTPATIENT)
Dept: INTERNAL MEDICINE | Facility: CLINIC | Age: 46
End: 2021-04-22

## 2021-09-22 ENCOUNTER — TRANSCRIPTION ENCOUNTER (OUTPATIENT)
Age: 46
End: 2021-09-22

## 2021-10-29 ENCOUNTER — TRANSCRIPTION ENCOUNTER (OUTPATIENT)
Age: 46
End: 2021-10-29

## 2022-01-01 ENCOUNTER — EMERGENCY (EMERGENCY)
Facility: HOSPITAL | Age: 47
LOS: 1 days | Discharge: HOME | End: 2022-01-01
Attending: EMERGENCY MEDICINE
Payer: MEDICAID

## 2022-01-01 VITALS
HEIGHT: 62 IN | DIASTOLIC BLOOD PRESSURE: 109 MMHG | OXYGEN SATURATION: 97 % | HEART RATE: 93 BPM | TEMPERATURE: 98 F | SYSTOLIC BLOOD PRESSURE: 149 MMHG | RESPIRATION RATE: 18 BRPM | WEIGHT: 173.06 LBS

## 2022-01-01 VITALS
DIASTOLIC BLOOD PRESSURE: 87 MMHG | HEART RATE: 80 BPM | OXYGEN SATURATION: 98 % | SYSTOLIC BLOOD PRESSURE: 117 MMHG | RESPIRATION RATE: 18 BRPM

## 2022-01-01 DIAGNOSIS — Z98.84 BARIATRIC SURGERY STATUS: Chronic | ICD-10-CM

## 2022-01-01 LAB
ALBUMIN SERPL ELPH-MCNC: 4.6 G/DL — SIGNIFICANT CHANGE UP (ref 3.3–5)
ALP SERPL-CCNC: 58 U/L — SIGNIFICANT CHANGE UP (ref 40–120)
ALT FLD-CCNC: 16 U/L — SIGNIFICANT CHANGE UP (ref 10–45)
ANION GAP SERPL CALC-SCNC: 15 MMOL/L — SIGNIFICANT CHANGE UP (ref 5–17)
APPEARANCE UR: CLEAR — SIGNIFICANT CHANGE UP
AST SERPL-CCNC: 21 U/L — SIGNIFICANT CHANGE UP (ref 10–40)
BACTERIA # UR AUTO: ABNORMAL
BASOPHILS # BLD AUTO: 0.06 K/UL — SIGNIFICANT CHANGE UP (ref 0–0.2)
BASOPHILS NFR BLD AUTO: 0.8 % — SIGNIFICANT CHANGE UP (ref 0–2)
BILIRUB SERPL-MCNC: 0.3 MG/DL — SIGNIFICANT CHANGE UP (ref 0.2–1.2)
BILIRUB UR-MCNC: NEGATIVE — SIGNIFICANT CHANGE UP
BUN SERPL-MCNC: 13 MG/DL — SIGNIFICANT CHANGE UP (ref 7–23)
CALCIUM SERPL-MCNC: 9.6 MG/DL — SIGNIFICANT CHANGE UP (ref 8.4–10.5)
CHLORIDE SERPL-SCNC: 100 MMOL/L — SIGNIFICANT CHANGE UP (ref 96–108)
CO2 SERPL-SCNC: 20 MMOL/L — LOW (ref 22–31)
COLOR SPEC: SIGNIFICANT CHANGE UP
CREAT SERPL-MCNC: 0.56 MG/DL — SIGNIFICANT CHANGE UP (ref 0.5–1.3)
DIFF PNL FLD: NEGATIVE — SIGNIFICANT CHANGE UP
EOSINOPHIL # BLD AUTO: 0.11 K/UL — SIGNIFICANT CHANGE UP (ref 0–0.5)
EOSINOPHIL NFR BLD AUTO: 1.4 % — SIGNIFICANT CHANGE UP (ref 0–6)
EPI CELLS # UR: 3 /HPF — SIGNIFICANT CHANGE UP
GLUCOSE SERPL-MCNC: 90 MG/DL — SIGNIFICANT CHANGE UP (ref 70–99)
GLUCOSE UR QL: NEGATIVE — SIGNIFICANT CHANGE UP
HCT VFR BLD CALC: 41.7 % — SIGNIFICANT CHANGE UP (ref 34.5–45)
HGB BLD-MCNC: 13.9 G/DL — SIGNIFICANT CHANGE UP (ref 11.5–15.5)
HYALINE CASTS # UR AUTO: 3 /LPF — HIGH (ref 0–2)
IMM GRANULOCYTES NFR BLD AUTO: 0.5 % — SIGNIFICANT CHANGE UP (ref 0–1.5)
KETONES UR-MCNC: NEGATIVE — SIGNIFICANT CHANGE UP
LEUKOCYTE ESTERASE UR-ACNC: NEGATIVE — SIGNIFICANT CHANGE UP
LYMPHOCYTES # BLD AUTO: 1.94 K/UL — SIGNIFICANT CHANGE UP (ref 1–3.3)
LYMPHOCYTES # BLD AUTO: 24.6 % — SIGNIFICANT CHANGE UP (ref 13–44)
MCHC RBC-ENTMCNC: 29.7 PG — SIGNIFICANT CHANGE UP (ref 27–34)
MCHC RBC-ENTMCNC: 33.3 GM/DL — SIGNIFICANT CHANGE UP (ref 32–36)
MCV RBC AUTO: 89.1 FL — SIGNIFICANT CHANGE UP (ref 80–100)
MONOCYTES # BLD AUTO: 0.56 K/UL — SIGNIFICANT CHANGE UP (ref 0–0.9)
MONOCYTES NFR BLD AUTO: 7.1 % — SIGNIFICANT CHANGE UP (ref 2–14)
NEUTROPHILS # BLD AUTO: 5.18 K/UL — SIGNIFICANT CHANGE UP (ref 1.8–7.4)
NEUTROPHILS NFR BLD AUTO: 65.6 % — SIGNIFICANT CHANGE UP (ref 43–77)
NITRITE UR-MCNC: NEGATIVE — SIGNIFICANT CHANGE UP
NRBC # BLD: 0 /100 WBCS — SIGNIFICANT CHANGE UP (ref 0–0)
PH UR: 6 — SIGNIFICANT CHANGE UP (ref 5–8)
PLATELET # BLD AUTO: 260 K/UL — SIGNIFICANT CHANGE UP (ref 150–400)
POTASSIUM SERPL-MCNC: 5.2 MMOL/L — SIGNIFICANT CHANGE UP (ref 3.5–5.3)
POTASSIUM SERPL-SCNC: 5.2 MMOL/L — SIGNIFICANT CHANGE UP (ref 3.5–5.3)
PROT SERPL-MCNC: 7.7 G/DL — SIGNIFICANT CHANGE UP (ref 6–8.3)
PROT UR-MCNC: NEGATIVE — SIGNIFICANT CHANGE UP
RBC # BLD: 4.68 M/UL — SIGNIFICANT CHANGE UP (ref 3.8–5.2)
RBC # FLD: 12.3 % — SIGNIFICANT CHANGE UP (ref 10.3–14.5)
RBC CASTS # UR COMP ASSIST: 3 /HPF — SIGNIFICANT CHANGE UP (ref 0–4)
SODIUM SERPL-SCNC: 135 MMOL/L — SIGNIFICANT CHANGE UP (ref 135–145)
SP GR SPEC: 1.02 — SIGNIFICANT CHANGE UP (ref 1.01–1.02)
UROBILINOGEN FLD QL: NEGATIVE — SIGNIFICANT CHANGE UP
WBC # BLD: 7.89 K/UL — SIGNIFICANT CHANGE UP (ref 3.8–10.5)
WBC # FLD AUTO: 7.89 K/UL — SIGNIFICANT CHANGE UP (ref 3.8–10.5)
WBC UR QL: 6 /HPF — HIGH (ref 0–5)

## 2022-01-01 PROCEDURE — 74176 CT ABD & PELVIS W/O CONTRAST: CPT | Mod: 26,MA

## 2022-01-01 PROCEDURE — 87186 SC STD MICRODIL/AGAR DIL: CPT

## 2022-01-01 PROCEDURE — 87086 URINE CULTURE/COLONY COUNT: CPT

## 2022-01-01 PROCEDURE — 85025 COMPLETE CBC W/AUTO DIFF WBC: CPT

## 2022-01-01 PROCEDURE — 99284 EMERGENCY DEPT VISIT MOD MDM: CPT | Mod: 25

## 2022-01-01 PROCEDURE — 99285 EMERGENCY DEPT VISIT HI MDM: CPT

## 2022-01-01 PROCEDURE — 80053 COMPREHEN METABOLIC PANEL: CPT

## 2022-01-01 PROCEDURE — 87077 CULTURE AEROBIC IDENTIFY: CPT

## 2022-01-01 PROCEDURE — 74176 CT ABD & PELVIS W/O CONTRAST: CPT | Mod: MA

## 2022-01-01 PROCEDURE — 81001 URINALYSIS AUTO W/SCOPE: CPT

## 2022-01-01 RX ORDER — OXYCODONE HYDROCHLORIDE 5 MG/1
5 TABLET ORAL ONCE
Refills: 0 | Status: DISCONTINUED | OUTPATIENT
Start: 2022-01-01 | End: 2022-01-01

## 2022-01-01 RX ORDER — LIDOCAINE 4 G/100G
1 CREAM TOPICAL ONCE
Refills: 0 | Status: COMPLETED | OUTPATIENT
Start: 2022-01-01 | End: 2022-01-01

## 2022-01-01 RX ORDER — DIAZEPAM 5 MG
1 TABLET ORAL
Qty: 9 | Refills: 0
Start: 2022-01-01 | End: 2022-01-03

## 2022-01-01 RX ORDER — KETOROLAC TROMETHAMINE 30 MG/ML
15 SYRINGE (ML) INJECTION ONCE
Refills: 0 | Status: DISCONTINUED | OUTPATIENT
Start: 2022-01-01 | End: 2022-01-01

## 2022-01-01 RX ORDER — DIAZEPAM 5 MG
5 TABLET ORAL ONCE
Refills: 0 | Status: DISCONTINUED | OUTPATIENT
Start: 2022-01-01 | End: 2022-01-01

## 2022-01-01 RX ORDER — ACETAMINOPHEN 500 MG
1000 TABLET ORAL ONCE
Refills: 0 | Status: COMPLETED | OUTPATIENT
Start: 2022-01-01 | End: 2022-01-01

## 2022-01-01 RX ADMIN — Medication 1000 MILLIGRAM(S): at 17:29

## 2022-01-01 RX ADMIN — Medication 5 MILLIGRAM(S): at 18:32

## 2022-01-01 RX ADMIN — LIDOCAINE 1 PATCH: 4 CREAM TOPICAL at 17:29

## 2022-01-01 RX ADMIN — OXYCODONE HYDROCHLORIDE 5 MILLIGRAM(S): 5 TABLET ORAL at 19:25

## 2022-01-01 RX ADMIN — Medication 15 MILLIGRAM(S): at 17:29

## 2022-01-01 NOTE — ED ADULT NURSE NOTE - NSIMPLEMENTINTERV_GEN_ALL_ED
Implemented All Universal Safety Interventions:  [place] to call system. Call bell, personal items and telephone within reach. Instruct patient to call for assistance. Room bathroom lighting operational. Non-slip footwear when patient is off stretcher. Physically safe environment: no spills, clutter or unnecessary equipment. Stretcher in lowest position, wheels locked, appropriate side rails in place.

## 2022-01-01 NOTE — ED PROVIDER NOTE - OBJECTIVE STATEMENT
46F no PMH here for pain in lower back x 2.5 days turned and feeling stabbing pain centrally in lumbar region, non-remitting, 7/10 NR to advil. Denies bowel or bladder incontinence, difficulty walking, radiation down leg, no urinary symptoms, no history of kidney stones. Endorses some pain to the abdomen but states that this feels like ovulation pain and is unrelated to back pain.

## 2022-01-01 NOTE — ED ADULT NURSE NOTE - OBJECTIVE STATEMENT
45 yo presents to the ED from home. A&Ox4, ambulatory c/o severe back pain; now radiating to lower abd. pt reports pain x 2 days. pt reports pain worse with movement. no trauma. no numbness, tingling. reports pain worse on R lower back. pt reports that she rolled R ankle a few weeks ago. denies any history of back issues. Pt resting in stretcher, call bell in hand and side rails up for safety. warm blanket provided, vital signs stable, pt in no acute distress. MD at bedside for eval. pt medicated as per orders.

## 2022-01-01 NOTE — ED PROVIDER NOTE - PHYSICAL EXAMINATION
Gen: WDWN, NAD  HEENT: EOMI, no nasal discharge, mucous membranes moist  CV: RRR, +S1/S2, no M/R/G  Resp: CTAB, no W/R/R  GI: Abdomen soft non-distended, NTTP, no masses  MSK: very tender to palpation on lower lumbar region both paraspinal and midline, (-) straight leg test  Neuro: A&Ox4, following commands, moving all four extremities spontaneously  Psych: appropriate mood, denies AH, VH, SI

## 2022-01-01 NOTE — ED PROVIDER NOTE - CARE PROVIDER_API CALL
Christiane Mast (MD)  Neurosurgery  [address], [address]  [place] [address]  Phone: [phone]  Fax: [phone]  Follow Up Time:

## 2022-01-01 NOTE — ED PROVIDER NOTE - NS ED ROS FT
Gen: No fever, normal appetite  Eyes: No eye irritation or discharge  ENT: No ear pain, congestion, sore throat  Resp: No cough or trouble breathing  Cardiovascular: No chest pain or palpitation  Gastroenteric: No nausea/vomiting, diarrhea, constipation; (+) ab pain  :  No change in urine output; no dysuria  MS: (+) back pain  Skin: No rashes  Neuro: No headache; no abnormal movements  Remainder negative, except as per the HPI

## 2022-01-01 NOTE — ED PROVIDER NOTE - CLINICAL SUMMARY MEDICAL DECISION MAKING FREE TEXT BOX
46F here for back pain x2 days. VSS, PE significant for ttp @lumbar spine paraspinally and midline. No bowel or bladder incontinence, gait normal. Low suspicion for neurogenic back pain, spinal stenosis or claudication, likely musculoskeletal in nature will do UA to r/o kidney stone and pain control. Likely dc with pain control

## 2022-01-01 NOTE — ED PROVIDER NOTE - PATIENT PORTAL LINK FT
You can access the FollowMyHealth Patient Portal offered by [facility] by registering at the following website: [email or web address]. By joining [email or web address]’s FollowMyHealth portal, you will also be able to view your health information using other applications (apps) compatible with our system.

## 2022-01-01 NOTE — ED PROVIDER NOTE - NSICDXPASTMEDICALHX_GEN_ALL_CORE_FT
PAST MEDICAL HISTORY:  Morbid Obesity BMI 35.2 on 11/28/17    Polycystic Ovarian Syndrome not taking medications at this time

## 2022-01-01 NOTE — ED PROVIDER NOTE - NSFOLLOWUPINSTRUCTIONS_ED_ALL_ED_FT
You were seen in the Emergency Department for back pain. Your CT scan did not show any emergent spine issues. Please take the medications as prescribed. Follow up with spine surgery as outpatient.     1) Continue all previously prescribed medications as directed.    2) Follow up with your primary care physician - take copies of your results.    3) Return to the Emergency Department for worsening or persistent symptoms, and/or ANY NEW OR CONCERNING SYMPTOMS.

## 2022-01-01 NOTE — ED PROVIDER NOTE - PROGRESS NOTE DETAILS
JASWINDER Flores (PGY-2) - CT negative, ambulated normally. Will dc w/ spine follow up. Patient reassessed, continues to complain of pain, will obtain CT and Labs to ro stone. Pain control and monitor.RGUJRAL Patient's result discussed with her, pain medication and return precautions. Follow up instuctions discussed. RGUJRAL

## 2022-01-01 NOTE — ED PROVIDER NOTE - ATTENDING CONTRIBUTION TO CARE
RGUJRAL 45yo f hx gastric surgery presents with back pain x 3 days. States she had turned when pain started. Pain is lower back and worse w movement. Denies any abdominal pain, n/v/d. Pt is due for her period and has some lower abdominal cramps that are normal. No blood in urine, no incontinence. Denies any trauma, fever, chills.   On exam, Patient is awake, alert and oriented x 3.  Patient is well appearing and in no acute distress.  NCAT, PERRL, EOMI.  Neck is supple, No LAD.  Lungs are CTA B/L,+S1S2 no murmurs,  Abdomen:Soft nd/nt+bs no rebound or guarding.  Back: no midline T/L tenderness. + B/L Paraspinal tenderness.   Extremity no edema or calf tender.  Skin with no rash.  Neuro CN3-12 intact. Strength 5/5 in upper and lower extremities. Nml Sensation.Gait normal.   Pain elicited with movement on exam. DDx MSK muscle spasm. Pain control and UA and re eval. RGUJRAL 45yo f hx gastric surgery 2011 presents with back pain x 3 days. States pain began when she had turned to her side. Pain is lower back and worse w movement. Denies any abdominal pain, n/v/d. Pt is due for her period and has some lower abdominal cramps that are normal. No blood in urine, no LE weakness, change in bladder or bowel. Denies any trauma, fever, chills.   On exam, Patient is awake, alert and oriented x 3.  Patient is well appearing and in no acute distress.  NCAT, PERRL  Neck is supple  Lungs are CTA B/L,+S1S2 no murmurs,  Abdomen:Soft nd/nt+bs no rebound or guarding.  Back: no midline T/L tenderness. + B/L Paraspinal tenderness.   Extremity no edema or calf tender.  Skin with no rash.  Neuro CN3-12 intact. Strength 5/5 in upper and lower extremities. Nml Sensation.Gait normal.   Pain elicited with movement on exam. Check UA and Pain control. DDx MSK muscle spasm. Monitor and re eval.

## 2022-01-01 NOTE — ED PROVIDER NOTE - NSICDXPASTSURGICALHX_GEN_ALL_CORE_FT
PAST SURGICAL HISTORY:  S/P bariatric surgery lap band 11/29/11    S/P Nasal Septoplasty 2007    S/P Tonsillectomy 2001

## 2022-01-02 RX ORDER — DIAZEPAM 5 MG
1 TABLET ORAL
Qty: 9 | Refills: 0
Start: 2022-01-02 | End: 2022-01-04

## 2022-01-02 NOTE — ED POST DISCHARGE NOTE - DETAILS
1/4: - Kim Hinojosa PA-C 1/5/22 - spoke with pt no urinary complaints but still with back pain, called in cefpodoxime for 10 days and gave follow up at spine center if no improvement..  Ash

## 2022-01-02 NOTE — ED POST DISCHARGE NOTE - ADDITIONAL DOCUMENTATION
Pt called ED with prescription issues. States RX valium sent to [facility] pharmacy she used in past. Insurance now only covering RX at [facility]. Spoke to [facility] and canceled RX. Resent intended prescription to [facility] - Beverley Reeves PA-C

## 2022-01-05 RX ORDER — CEFPODOXIME PROXETIL 100 MG
1 TABLET ORAL
Qty: 20 | Refills: 0
Start: 2022-01-05 | End: 2022-01-14

## 2022-01-06 ENCOUNTER — APPOINTMENT (OUTPATIENT)
Dept: INTERNAL MEDICINE | Facility: CLINIC | Age: 47
End: 2022-01-06

## 2022-01-18 ENCOUNTER — APPOINTMENT (OUTPATIENT)
Dept: NEUROSURGERY | Facility: CLINIC | Age: 47
End: 2022-01-18

## 2022-01-20 ENCOUNTER — TRANSCRIPTION ENCOUNTER (OUTPATIENT)
Age: 47
End: 2022-01-20

## 2022-03-18 ENCOUNTER — TRANSCRIPTION ENCOUNTER (OUTPATIENT)
Age: 47
End: 2022-03-18

## 2022-03-23 ENCOUNTER — APPOINTMENT (OUTPATIENT)
Dept: OBGYN | Facility: CLINIC | Age: 47
End: 2022-03-23

## 2022-03-23 ENCOUNTER — TRANSCRIPTION ENCOUNTER (OUTPATIENT)
Age: 47
End: 2022-03-23

## 2022-03-29 ENCOUNTER — NON-APPOINTMENT (OUTPATIENT)
Age: 47
End: 2022-03-29

## 2022-03-29 ENCOUNTER — APPOINTMENT (OUTPATIENT)
Dept: OBGYN | Facility: CLINIC | Age: 47
End: 2022-03-29
Payer: MEDICAID

## 2022-03-29 VITALS
BODY MASS INDEX: 33.99 KG/M2 | DIASTOLIC BLOOD PRESSURE: 86 MMHG | HEART RATE: 83 BPM | HEIGHT: 61 IN | SYSTOLIC BLOOD PRESSURE: 150 MMHG | WEIGHT: 180 LBS

## 2022-03-29 VITALS — DIASTOLIC BLOOD PRESSURE: 84 MMHG | HEART RATE: 76 BPM | SYSTOLIC BLOOD PRESSURE: 135 MMHG

## 2022-03-29 DIAGNOSIS — Z87.42 PERSONAL HISTORY OF OTHER DISEASES OF THE FEMALE GENITAL TRACT: ICD-10-CM

## 2022-03-29 PROCEDURE — 99396 PREV VISIT EST AGE 40-64: CPT

## 2022-03-29 RX ORDER — METRONIDAZOLE 7.5 MG/G
0.75 GEL VAGINAL
Qty: 1 | Refills: 1 | Status: DISCONTINUED | COMMUNITY
Start: 2020-03-01 | End: 2022-03-29

## 2022-03-29 RX ORDER — METRONIDAZOLE 500 MG/1
500 TABLET ORAL
Qty: 14 | Refills: 0 | Status: DISCONTINUED | COMMUNITY
Start: 2020-03-24 | End: 2022-03-29

## 2022-03-29 RX ORDER — CLINDAMYCIN PHOSPHATE 20 MG/G
2 CREAM VAGINAL
Qty: 1 | Refills: 0 | Status: DISCONTINUED | COMMUNITY
Start: 2020-03-23 | End: 2022-03-29

## 2022-04-01 ENCOUNTER — APPOINTMENT (OUTPATIENT)
Dept: MAMMOGRAPHY | Facility: CLINIC | Age: 47
End: 2022-04-01
Payer: MEDICAID

## 2022-04-01 ENCOUNTER — APPOINTMENT (OUTPATIENT)
Dept: ULTRASOUND IMAGING | Facility: CLINIC | Age: 47
End: 2022-04-01
Payer: MEDICAID

## 2022-04-01 ENCOUNTER — OUTPATIENT (OUTPATIENT)
Dept: OUTPATIENT SERVICES | Facility: HOSPITAL | Age: 47
LOS: 1 days | End: 2022-04-01
Payer: MEDICAID

## 2022-04-01 ENCOUNTER — RESULT REVIEW (OUTPATIENT)
Age: 47
End: 2022-04-01

## 2022-04-01 DIAGNOSIS — Z00.8 ENCOUNTER FOR OTHER GENERAL EXAMINATION: ICD-10-CM

## 2022-04-01 DIAGNOSIS — Z98.84 BARIATRIC SURGERY STATUS: Chronic | ICD-10-CM

## 2022-04-01 DIAGNOSIS — Z12.39 ENCOUNTER FOR OTHER SCREENING FOR MALIGNANT NEOPLASM OF BREAST: ICD-10-CM

## 2022-04-01 PROCEDURE — 77063 BREAST TOMOSYNTHESIS BI: CPT | Mod: 26

## 2022-04-01 PROCEDURE — 77067 SCR MAMMO BI INCL CAD: CPT | Mod: 26

## 2022-04-01 PROCEDURE — 76641 ULTRASOUND BREAST COMPLETE: CPT

## 2022-04-01 PROCEDURE — 77063 BREAST TOMOSYNTHESIS BI: CPT

## 2022-04-01 PROCEDURE — 76641 ULTRASOUND BREAST COMPLETE: CPT | Mod: 26,50

## 2022-04-01 PROCEDURE — 77067 SCR MAMMO BI INCL CAD: CPT

## 2022-04-07 LAB
C TRACH RRNA SPEC QL NAA+PROBE: NOT DETECTED
CANDIDA VAG CYTO: NOT DETECTED
CYTOLOGY CVX/VAG DOC THIN PREP: NORMAL
G VAGINALIS+PREV SP MTYP VAG QL MICRO: NOT DETECTED
HPV HIGH+LOW RISK DNA PNL CVX: NOT DETECTED
N GONORRHOEA RRNA SPEC QL NAA+PROBE: NOT DETECTED
SOURCE AMPLIFICATION: NORMAL
T VAGINALIS VAG QL WET PREP: NOT DETECTED

## 2022-04-14 ENCOUNTER — APPOINTMENT (OUTPATIENT)
Dept: OTOLARYNGOLOGY | Facility: CLINIC | Age: 47
End: 2022-04-14
Payer: MEDICAID

## 2022-04-14 VITALS
HEIGHT: 62 IN | HEART RATE: 94 BPM | WEIGHT: 180 LBS | BODY MASS INDEX: 33.13 KG/M2 | SYSTOLIC BLOOD PRESSURE: 135 MMHG | DIASTOLIC BLOOD PRESSURE: 89 MMHG

## 2022-04-14 DIAGNOSIS — Z78.9 OTHER SPECIFIED HEALTH STATUS: ICD-10-CM

## 2022-04-14 PROCEDURE — 99214 OFFICE O/P EST MOD 30 MIN: CPT

## 2022-04-14 RX ORDER — PRENATAL VIT NO.130/IRON/FOLIC 27MG-0.8MG
TABLET ORAL
Refills: 0 | Status: ACTIVE | COMMUNITY

## 2022-04-14 RX ORDER — DOXYCLYCLINE HYCLATE 150 MG/1
TABLET, COATED ORAL
Refills: 0 | Status: COMPLETED | COMMUNITY
End: 2022-04-14

## 2022-04-14 NOTE — HISTORY OF PRESENT ILLNESS
[de-identified] : 46 yro female pt with h/o tonsillectomy, gastric sleeve surgery, rhinoplasty for deviated septum, and acid reflux,  was referred by urgent care for eval of parotid gland. \par Pt c/o dull bilateral ear pain/pressure for the past 2 years, associated with constant bilateral preauricular swelling/bulging. Pt had one episode of left submandibular pain about 1 month ago, lasting for about 2 weeks. Pt states she feels a "rush" going from sides of throat to ears worse when she stressed.  Left side of mouth does not "" when she smiles occasionally. Pt c/o excess saliva and throat clearing. \par Today pt denies globus sensation, dysphagia, dyspnea, dysphonia. No changes in hearing or otorrhea. No fever, chills, weight loss. Eating and drinking without issues. \par No imaging done. \par Pt had Intrauterine insemination (IUA) done 4/12/22 \par

## 2022-05-06 ENCOUNTER — APPOINTMENT (OUTPATIENT)
Dept: INTERNAL MEDICINE | Facility: CLINIC | Age: 47
End: 2022-05-06

## 2022-05-16 ENCOUNTER — APPOINTMENT (OUTPATIENT)
Dept: INTERNAL MEDICINE | Facility: CLINIC | Age: 47
End: 2022-05-16

## 2022-06-08 ENCOUNTER — APPOINTMENT (OUTPATIENT)
Dept: OBGYN | Facility: CLINIC | Age: 47
End: 2022-06-08

## 2022-11-03 NOTE — H&P PST ADULT - ASSESSMENT
Al returned the call and the previous message has been completed.    35 year old morbidly obese female for lap band

## 2023-01-07 ENCOUNTER — NON-APPOINTMENT (OUTPATIENT)
Age: 48
End: 2023-01-07

## 2023-03-20 ENCOUNTER — APPOINTMENT (OUTPATIENT)
Dept: OTOLARYNGOLOGY | Facility: CLINIC | Age: 48
End: 2023-03-20
Payer: MEDICAID

## 2023-03-20 VITALS
OXYGEN SATURATION: 99 % | BODY MASS INDEX: 34.6 KG/M2 | DIASTOLIC BLOOD PRESSURE: 99 MMHG | HEIGHT: 62 IN | SYSTOLIC BLOOD PRESSURE: 146 MMHG | HEART RATE: 89 BPM | WEIGHT: 188 LBS

## 2023-03-20 DIAGNOSIS — R60.0 LOCALIZED EDEMA: ICD-10-CM

## 2023-03-20 DIAGNOSIS — G89.29 ARTHRALGIA OF TEMPOROMANDIBULAR JOINT,: ICD-10-CM

## 2023-03-20 DIAGNOSIS — M26.629 ARTHRALGIA OF TEMPOROMANDIBULAR JOINT,: ICD-10-CM

## 2023-03-20 DIAGNOSIS — M79.18 MYALGIA, OTHER SITE: ICD-10-CM

## 2023-03-20 PROCEDURE — 99213 OFFICE O/P EST LOW 20 MIN: CPT

## 2023-03-20 NOTE — REASON FOR VISIT
[Initial Evaluation] : an initial evaluation for [FreeTextEntry2] : ear pain, enlargement of lymph nodes

## 2023-03-20 NOTE — HISTORY OF PRESENT ILLNESS
[de-identified] : 46yo female with acid reflux. She notes salivary glands seem to overproduce saliva. She feels pain in the area in front of the cheek. Seems worse with stress. She had hx of TMJ and notes hx of clenching at night. She has tried an OTC dental guard but she felt it made it worse. Has not seen a dentist/oral surgeon.

## 2023-03-20 NOTE — ASSESSMENT
[FreeTextEntry1] : ear pain, suspect TMJ:\par - ?parotid duct enlargement noted on parotid us and she notes swelling in front of her ears so CT neck with contrast to eval parotids\par - CT neck will also show TMJ if any obvious issues and middle ear\par - recommend f/u with oral surgeon for molded \par \par neck pain/tightness:\par - agree with myofascial therapy and can consider neurology eval for Botox which she is interested in\par - will call with CT results\par \par

## 2023-03-24 ENCOUNTER — NON-APPOINTMENT (OUTPATIENT)
Age: 48
End: 2023-03-24

## 2023-03-25 ENCOUNTER — APPOINTMENT (OUTPATIENT)
Dept: CT IMAGING | Facility: IMAGING CENTER | Age: 48
End: 2023-03-25

## 2023-03-28 ENCOUNTER — APPOINTMENT (OUTPATIENT)
Dept: OBGYN | Facility: CLINIC | Age: 48
End: 2023-03-28
Payer: MEDICAID

## 2023-03-28 VITALS
DIASTOLIC BLOOD PRESSURE: 100 MMHG | HEIGHT: 62 IN | WEIGHT: 186 LBS | BODY MASS INDEX: 34.23 KG/M2 | HEART RATE: 99 BPM | SYSTOLIC BLOOD PRESSURE: 146 MMHG

## 2023-03-28 DIAGNOSIS — Z01.419 ENCOUNTER FOR GYNECOLOGICAL EXAMINATION (GENERAL) (ROUTINE) W/OUT ABNORMAL FINDINGS: ICD-10-CM

## 2023-03-28 LAB — T PALLIDUM AB SER QL IA: NEGATIVE

## 2023-03-28 PROCEDURE — 99396 PREV VISIT EST AGE 40-64: CPT

## 2023-03-28 NOTE — PHYSICAL EXAM
[Chaperone Present] : A chaperone was present in the examining room during all aspects of the physical examination [FreeTextEntry1] : QUENTIN Garcia [Appropriately responsive] : appropriately responsive [Alert] : alert [No Acute Distress] : no acute distress [No Lymphadenopathy] : no lymphadenopathy [Regular Rate Rhythm] : regular rate rhythm [No Murmurs] : no murmurs [Clear to Auscultation B/L] : clear to auscultation bilaterally [Soft] : soft [Non-tender] : non-tender [Non-distended] : non-distended [No HSM] : No HSM [No Lesions] : no lesions [No Mass] : no mass [Oriented x3] : oriented x3 [Examination Of The Breasts] : a normal appearance [No Discharge] : no discharge [No Masses] : no breast masses were palpable [Labia Majora] : normal [Labia Minora] : normal [Normal] : normal [Tenderness] : nontender [Enlarged ___ wks] : not enlarged [Uterine Adnexae] : normal

## 2023-03-28 NOTE — PLAN
[FreeTextEntry1] : 46 y/o presents for annual visit. Pt undergoing fertility treatment, considering donor egg\par \par #HM\par -Pap with HPV today\par -Mammo requisition given\par -STI panel today \par \par #Infertility\par -[facility] sent at pts request \par \par catrachito CONNER\par

## 2023-03-29 LAB
C TRACH RRNA SPEC QL NAA+PROBE: NOT DETECTED
HBV SURFACE AG SER QL: NONREACTIVE
HCV AB SER QL: NONREACTIVE
HCV S/CO RATIO: 0.12 S/CO
HIV1+2 AB SPEC QL IA.RAPID: NONREACTIVE
N GONORRHOEA RRNA SPEC QL NAA+PROBE: NOT DETECTED
SOURCE AMPLIFICATION: NORMAL

## 2023-03-31 LAB — HPV HIGH+LOW RISK DNA PNL CVX: NOT DETECTED

## 2023-04-01 LAB — CYTOLOGY CVX/VAG DOC THIN PREP: NORMAL

## 2023-04-04 ENCOUNTER — OUTPATIENT (OUTPATIENT)
Dept: OUTPATIENT SERVICES | Facility: HOSPITAL | Age: 48
LOS: 1 days | End: 2023-04-04
Payer: MEDICAID

## 2023-04-04 ENCOUNTER — APPOINTMENT (OUTPATIENT)
Dept: CT IMAGING | Facility: IMAGING CENTER | Age: 48
End: 2023-04-04
Payer: MEDICAID

## 2023-04-04 DIAGNOSIS — R60.0 LOCALIZED EDEMA: ICD-10-CM

## 2023-04-04 DIAGNOSIS — M26.629 ARTHRALGIA OF TEMPOROMANDIBULAR JOINT, UNSPECIFIED SIDE: ICD-10-CM

## 2023-04-04 DIAGNOSIS — Z98.84 BARIATRIC SURGERY STATUS: Chronic | ICD-10-CM

## 2023-04-04 DIAGNOSIS — M79.18 MYALGIA, OTHER SITE: ICD-10-CM

## 2023-04-04 PROCEDURE — 70491 CT SOFT TISSUE NECK W/DYE: CPT

## 2023-04-04 PROCEDURE — 70491 CT SOFT TISSUE NECK W/DYE: CPT | Mod: 26

## 2023-04-05 ENCOUNTER — RESULT REVIEW (OUTPATIENT)
Age: 48
End: 2023-04-05

## 2023-04-05 ENCOUNTER — APPOINTMENT (OUTPATIENT)
Dept: ULTRASOUND IMAGING | Facility: CLINIC | Age: 48
End: 2023-04-05
Payer: MEDICAID

## 2023-04-05 ENCOUNTER — APPOINTMENT (OUTPATIENT)
Dept: MAMMOGRAPHY | Facility: CLINIC | Age: 48
End: 2023-04-05
Payer: MEDICAID

## 2023-04-05 PROCEDURE — 76641 ULTRASOUND BREAST COMPLETE: CPT | Mod: 50

## 2023-04-05 PROCEDURE — 77067 SCR MAMMO BI INCL CAD: CPT

## 2023-04-05 PROCEDURE — 77063 BREAST TOMOSYNTHESIS BI: CPT

## 2023-04-06 ENCOUNTER — NON-APPOINTMENT (OUTPATIENT)
Age: 48
End: 2023-04-06

## 2023-04-07 ENCOUNTER — NON-APPOINTMENT (OUTPATIENT)
Age: 48
End: 2023-04-07

## 2023-04-10 LAB — ANTI-MUELLERIAN HORMONE: 1.56 NG/ML

## 2023-04-15 ENCOUNTER — NON-APPOINTMENT (OUTPATIENT)
Age: 48
End: 2023-04-15

## 2023-04-20 ENCOUNTER — NON-APPOINTMENT (OUTPATIENT)
Age: 48
End: 2023-04-20

## 2023-04-27 ENCOUNTER — APPOINTMENT (OUTPATIENT)
Dept: OTOLARYNGOLOGY | Facility: CLINIC | Age: 48
End: 2023-04-27

## 2023-05-02 ENCOUNTER — NON-APPOINTMENT (OUTPATIENT)
Age: 48
End: 2023-05-02

## 2023-05-15 ENCOUNTER — NON-APPOINTMENT (OUTPATIENT)
Age: 48
End: 2023-05-15

## 2023-11-14 ENCOUNTER — APPOINTMENT (OUTPATIENT)
Dept: INTERNAL MEDICINE | Facility: CLINIC | Age: 48
End: 2023-11-14

## 2023-12-06 ENCOUNTER — APPOINTMENT (OUTPATIENT)
Dept: OBGYN | Facility: CLINIC | Age: 48
End: 2023-12-06
Payer: MEDICAID

## 2023-12-06 ENCOUNTER — ASOB RESULT (OUTPATIENT)
Age: 48
End: 2023-12-06

## 2023-12-06 VITALS
WEIGHT: 182 LBS | HEART RATE: 93 BPM | BODY MASS INDEX: 33.49 KG/M2 | HEIGHT: 62 IN | SYSTOLIC BLOOD PRESSURE: 132 MMHG | DIASTOLIC BLOOD PRESSURE: 88 MMHG

## 2023-12-06 PROCEDURE — 99203 OFFICE O/P NEW LOW 30 MIN: CPT

## 2023-12-06 PROCEDURE — 76816 OB US FOLLOW-UP PER FETUS: CPT

## 2023-12-07 LAB
ABO + RH PNL BLD: NORMAL
BLD GP AB SCN SERPL QL: NORMAL
C TRACH RRNA SPEC QL NAA+PROBE: NOT DETECTED
HBV SURFACE AG SER QL: NONREACTIVE
HIV1+2 AB SPEC QL IA.RAPID: NONREACTIVE
N GONORRHOEA RRNA SPEC QL NAA+PROBE: NOT DETECTED
SOURCE AMPLIFICATION: NORMAL
T PALLIDUM AB SER QL IA: NEGATIVE

## 2023-12-08 LAB
BACTERIA UR CULT: NORMAL
CMV IGG SERPL QL: <0.2 U/ML
CMV IGG SERPL-IMP: NEGATIVE
CMV IGM SERPL QL: <8 AU/ML
CMV IGM SERPL QL: NEGATIVE
HGB A MFR BLD: 98.4 %
HGB A2 MFR BLD: 1.6 %
HGB FRACT BLD-IMP: NORMAL
MEV IGG FLD QL IA: 17.8 AU/ML
MEV IGG+IGM SER-IMP: POSITIVE
RUBV IGG FLD-ACNC: 3 INDEX
RUBV IGG SER-IMP: POSITIVE
T GONDII AB SER-IMP: NEGATIVE
T GONDII AB SER-IMP: NEGATIVE
T GONDII IGG SER QL: <3 IU/ML
T GONDII IGM SER QL: <3 AU/ML
VZV AB TITR SER: POSITIVE
VZV IGG SER IF-ACNC: 309.2 INDEX

## 2023-12-10 ENCOUNTER — NON-APPOINTMENT (OUTPATIENT)
Age: 48
End: 2023-12-10

## 2023-12-21 ENCOUNTER — LABORATORY RESULT (OUTPATIENT)
Age: 48
End: 2023-12-21

## 2023-12-21 ENCOUNTER — ASOB RESULT (OUTPATIENT)
Age: 48
End: 2023-12-21

## 2023-12-21 ENCOUNTER — APPOINTMENT (OUTPATIENT)
Dept: ANTEPARTUM | Facility: CLINIC | Age: 48
End: 2023-12-21
Payer: MEDICAID

## 2023-12-21 PROCEDURE — 76813 OB US NUCHAL MEAS 1 GEST: CPT

## 2023-12-21 PROCEDURE — 76801 OB US < 14 WKS SINGLE FETUS: CPT

## 2023-12-26 ENCOUNTER — TRANSCRIPTION ENCOUNTER (OUTPATIENT)
Age: 48
End: 2023-12-26

## 2023-12-27 ENCOUNTER — NON-APPOINTMENT (OUTPATIENT)
Age: 48
End: 2023-12-27

## 2024-01-01 LAB — LEAD BLD-MCNC: <1 UG/DL

## 2024-01-02 ENCOUNTER — NON-APPOINTMENT (OUTPATIENT)
Age: 49
End: 2024-01-02

## 2024-01-02 ENCOUNTER — APPOINTMENT (OUTPATIENT)
Dept: OBGYN | Facility: CLINIC | Age: 49
End: 2024-01-02
Payer: COMMERCIAL

## 2024-01-02 VITALS
SYSTOLIC BLOOD PRESSURE: 144 MMHG | HEIGHT: 62 IN | BODY MASS INDEX: 34.23 KG/M2 | WEIGHT: 186 LBS | DIASTOLIC BLOOD PRESSURE: 94 MMHG | HEART RATE: 89 BPM

## 2024-01-02 PROCEDURE — 0502F SUBSEQUENT PRENATAL CARE: CPT

## 2024-01-06 ENCOUNTER — NON-APPOINTMENT (OUTPATIENT)
Age: 49
End: 2024-01-06

## 2024-01-07 LAB
B19V IGG SER QL IA: ABNORMAL
B19V IGG+IGM SER-IMP: NORMAL
B19V IGM FLD-ACNC: NEGATIVE

## 2024-01-18 ENCOUNTER — APPOINTMENT (OUTPATIENT)
Dept: ANTEPARTUM | Facility: CLINIC | Age: 49
End: 2024-01-18
Payer: MEDICAID

## 2024-01-18 ENCOUNTER — ASOB RESULT (OUTPATIENT)
Age: 49
End: 2024-01-18

## 2024-01-18 PROCEDURE — 76815 OB US LIMITED FETUS(S): CPT

## 2024-01-18 PROCEDURE — 76817 TRANSVAGINAL US OBSTETRIC: CPT

## 2024-01-18 PROCEDURE — 99202 OFFICE O/P NEW SF 15 MIN: CPT | Mod: 25

## 2024-01-21 ENCOUNTER — NON-APPOINTMENT (OUTPATIENT)
Age: 49
End: 2024-01-21

## 2024-01-30 ENCOUNTER — APPOINTMENT (OUTPATIENT)
Dept: OBGYN | Facility: CLINIC | Age: 49
End: 2024-01-30
Payer: MEDICAID

## 2024-01-30 ENCOUNTER — NON-APPOINTMENT (OUTPATIENT)
Age: 49
End: 2024-01-30

## 2024-01-30 VITALS
DIASTOLIC BLOOD PRESSURE: 86 MMHG | SYSTOLIC BLOOD PRESSURE: 133 MMHG | WEIGHT: 184 LBS | BODY MASS INDEX: 33.86 KG/M2 | HEART RATE: 93 BPM | HEIGHT: 62 IN

## 2024-01-30 PROCEDURE — 0502F SUBSEQUENT PRENATAL CARE: CPT

## 2024-02-01 ENCOUNTER — APPOINTMENT (OUTPATIENT)
Dept: OBGYN | Facility: CLINIC | Age: 49
End: 2024-02-01

## 2024-02-09 ENCOUNTER — NON-APPOINTMENT (OUTPATIENT)
Age: 49
End: 2024-02-09

## 2024-02-10 LAB
AFP MOM: 1.06
AFP VALUE: 40.4 NG/ML
ALPHA FETOPROTEIN SERUM COMMENT: NORMAL
ALPHA FETOPROTEIN SERUM INTERPRETATION: NORMAL
ALPHA FETOPROTEIN SERUM RESULTS: NORMAL
ALPHA FETOPROTEIN SERUM TEST RESULTS: NORMAL
GESTATIONAL AGE BASED ON: NORMAL
GESTATIONAL AGE ON COLLECTION DATE: 17.9 WEEKS
INSULIN DEP DIABETES: NO
MATERNAL AGE AT EDD AFP: 46.6 YR
MULTIPLE GESTATION: NO
OSBR RISK 1 IN: NORMAL
RACE: NORMAL
WEIGHT AFP: 184 LBS

## 2024-02-15 ENCOUNTER — APPOINTMENT (OUTPATIENT)
Dept: ANTEPARTUM | Facility: CLINIC | Age: 49
End: 2024-02-15
Payer: MEDICAID

## 2024-02-15 ENCOUNTER — ASOB RESULT (OUTPATIENT)
Age: 49
End: 2024-02-15

## 2024-02-15 PROCEDURE — 76811 OB US DETAILED SNGL FETUS: CPT

## 2024-02-21 ENCOUNTER — ASOB RESULT (OUTPATIENT)
Age: 49
End: 2024-02-21

## 2024-02-21 ENCOUNTER — APPOINTMENT (OUTPATIENT)
Dept: ANTEPARTUM | Facility: CLINIC | Age: 49
End: 2024-02-21
Payer: COMMERCIAL

## 2024-02-21 PROCEDURE — 76825 ECHO EXAM OF FETAL HEART: CPT

## 2024-02-21 PROCEDURE — 76827 ECHO EXAM OF FETAL HEART: CPT | Mod: 59

## 2024-02-21 PROCEDURE — 76815 OB US LIMITED FETUS(S): CPT

## 2024-02-21 PROCEDURE — 93325 DOPPLER ECHO COLOR FLOW MAPG: CPT

## 2024-02-27 ENCOUNTER — APPOINTMENT (OUTPATIENT)
Dept: OBGYN | Facility: CLINIC | Age: 49
End: 2024-02-27
Payer: MEDICAID

## 2024-02-27 ENCOUNTER — APPOINTMENT (OUTPATIENT)
Dept: OBGYN | Facility: CLINIC | Age: 49
End: 2024-02-27

## 2024-02-27 VITALS
WEIGHT: 186 LBS | DIASTOLIC BLOOD PRESSURE: 86 MMHG | SYSTOLIC BLOOD PRESSURE: 146 MMHG | HEART RATE: 90 BPM | BODY MASS INDEX: 34.23 KG/M2 | HEIGHT: 62 IN

## 2024-02-27 PROCEDURE — 0502F SUBSEQUENT PRENATAL CARE: CPT

## 2024-03-18 ENCOUNTER — APPOINTMENT (OUTPATIENT)
Dept: OBGYN | Facility: CLINIC | Age: 49
End: 2024-03-18
Payer: MEDICAID

## 2024-03-18 VITALS
SYSTOLIC BLOOD PRESSURE: 133 MMHG | DIASTOLIC BLOOD PRESSURE: 85 MMHG | BODY MASS INDEX: 35.15 KG/M2 | HEIGHT: 62 IN | WEIGHT: 191 LBS | HEART RATE: 93 BPM

## 2024-03-18 VITALS — SYSTOLIC BLOOD PRESSURE: 122 MMHG | DIASTOLIC BLOOD PRESSURE: 81 MMHG | HEART RATE: 90 BPM

## 2024-03-18 PROCEDURE — 0502F SUBSEQUENT PRENATAL CARE: CPT

## 2024-03-19 ENCOUNTER — APPOINTMENT (OUTPATIENT)
Dept: OBGYN | Facility: CLINIC | Age: 49
End: 2024-03-19

## 2024-03-20 ENCOUNTER — NON-APPOINTMENT (OUTPATIENT)
Age: 49
End: 2024-03-20

## 2024-04-05 ENCOUNTER — APPOINTMENT (OUTPATIENT)
Dept: OBGYN | Facility: CLINIC | Age: 49
End: 2024-04-05
Payer: COMMERCIAL

## 2024-04-05 ENCOUNTER — NON-APPOINTMENT (OUTPATIENT)
Age: 49
End: 2024-04-05

## 2024-04-05 VITALS
BODY MASS INDEX: 35.51 KG/M2 | HEIGHT: 62 IN | DIASTOLIC BLOOD PRESSURE: 82 MMHG | WEIGHT: 193 LBS | HEART RATE: 92 BPM | SYSTOLIC BLOOD PRESSURE: 124 MMHG

## 2024-04-05 DIAGNOSIS — Z34.00 ENCOUNTER FOR SUPERVISION OF NORMAL FIRST PREGNANCY, UNSPECIFIED TRIMESTER: ICD-10-CM

## 2024-04-05 DIAGNOSIS — Z34.90 ENCOUNTER FOR SUPERVISION OF NORMAL PREGNANCY, UNSPECIFIED, UNSPECIFIED TRIMESTER: ICD-10-CM

## 2024-04-05 DIAGNOSIS — O44.02 COMPLETE PLACENTA PREVIA NOS OR WITHOUT HEMORRHAGE, SECOND TRIMESTER: ICD-10-CM

## 2024-04-05 PROCEDURE — 0502F SUBSEQUENT PRENATAL CARE: CPT

## 2024-04-08 ENCOUNTER — NON-APPOINTMENT (OUTPATIENT)
Age: 49
End: 2024-04-08

## 2024-04-08 ENCOUNTER — TRANSCRIPTION ENCOUNTER (OUTPATIENT)
Age: 49
End: 2024-04-08

## 2024-04-08 LAB
ABO + RH PNL BLD: NORMAL
BLD GP AB SCN SERPL QL: NORMAL
GLUCOSE 1H P 100 G GLC PO SERPL-MCNC: 220 MG/DL
HCT VFR BLD CALC: 34.8 %
HGB BLD-MCNC: 11.4 G/DL
MCHC RBC-ENTMCNC: 29.9 PG
MCHC RBC-ENTMCNC: 32.8 GM/DL
MCV RBC AUTO: 91.3 FL
PLATELET # BLD AUTO: 260 K/UL
RBC # BLD: 3.81 M/UL
RBC # FLD: 14.3 %
T PALLIDUM AB SER QL IA: NEGATIVE
WBC # FLD AUTO: 11.05 K/UL

## 2024-04-09 ENCOUNTER — APPOINTMENT (OUTPATIENT)
Dept: OBGYN | Facility: CLINIC | Age: 49
End: 2024-04-09

## 2024-04-10 ENCOUNTER — ASOB RESULT (OUTPATIENT)
Age: 49
End: 2024-04-10

## 2024-04-10 ENCOUNTER — APPOINTMENT (OUTPATIENT)
Dept: MATERNAL FETAL MEDICINE | Facility: CLINIC | Age: 49
End: 2024-04-10
Payer: COMMERCIAL

## 2024-04-10 ENCOUNTER — TRANSCRIPTION ENCOUNTER (OUTPATIENT)
Age: 49
End: 2024-04-10

## 2024-04-10 PROCEDURE — G0109 DIAB MANAGE TRN IND/GROUP: CPT | Mod: 95

## 2024-04-18 ENCOUNTER — APPOINTMENT (OUTPATIENT)
Dept: MATERNAL FETAL MEDICINE | Facility: CLINIC | Age: 49
End: 2024-04-18
Payer: COMMERCIAL

## 2024-04-18 ENCOUNTER — ASOB RESULT (OUTPATIENT)
Age: 49
End: 2024-04-18

## 2024-04-18 ENCOUNTER — APPOINTMENT (OUTPATIENT)
Dept: ANTEPARTUM | Facility: CLINIC | Age: 49
End: 2024-04-18
Payer: COMMERCIAL

## 2024-04-18 PROCEDURE — 76816 OB US FOLLOW-UP PER FETUS: CPT

## 2024-04-18 PROCEDURE — G0108 DIAB MANAGE TRN  PER INDIV: CPT

## 2024-04-18 PROCEDURE — 76819 FETAL BIOPHYS PROFIL W/O NST: CPT | Mod: 59

## 2024-04-19 ENCOUNTER — APPOINTMENT (OUTPATIENT)
Dept: ANTEPARTUM | Facility: CLINIC | Age: 49
End: 2024-04-19

## 2024-04-23 ENCOUNTER — NON-APPOINTMENT (OUTPATIENT)
Age: 49
End: 2024-04-23

## 2024-04-23 ENCOUNTER — APPOINTMENT (OUTPATIENT)
Dept: OBGYN | Facility: CLINIC | Age: 49
End: 2024-04-23
Payer: COMMERCIAL

## 2024-04-23 ENCOUNTER — APPOINTMENT (OUTPATIENT)
Dept: OBGYN | Facility: CLINIC | Age: 49
End: 2024-04-23

## 2024-04-23 VITALS
BODY MASS INDEX: 35.7 KG/M2 | DIASTOLIC BLOOD PRESSURE: 85 MMHG | WEIGHT: 194 LBS | HEART RATE: 93 BPM | HEIGHT: 62 IN | SYSTOLIC BLOOD PRESSURE: 137 MMHG

## 2024-04-23 PROCEDURE — 96372 THER/PROPH/DIAG INJ SC/IM: CPT

## 2024-04-23 RX ADMIN — HUMAN RHO(D) IMMUNE GLOBULIN 0 UNIT: 300 INJECTION, SOLUTION INTRAMUSCULAR at 00:00

## 2024-04-24 RX ORDER — HUMAN RHO(D) IMMUNE GLOBULIN 300 UG/1
1500 INJECTION, SOLUTION INTRAMUSCULAR
Qty: 0 | Refills: 0 | Status: COMPLETED | OUTPATIENT
Start: 2024-04-23

## 2024-04-25 ENCOUNTER — APPOINTMENT (OUTPATIENT)
Dept: MATERNAL FETAL MEDICINE | Facility: CLINIC | Age: 49
End: 2024-04-25

## 2024-05-08 ENCOUNTER — APPOINTMENT (OUTPATIENT)
Dept: ANTEPARTUM | Facility: CLINIC | Age: 49
End: 2024-05-08

## 2024-05-09 ENCOUNTER — NON-APPOINTMENT (OUTPATIENT)
Age: 49
End: 2024-05-09

## 2024-05-09 ENCOUNTER — APPOINTMENT (OUTPATIENT)
Dept: ANTEPARTUM | Facility: CLINIC | Age: 49
End: 2024-05-09
Payer: COMMERCIAL

## 2024-05-09 ENCOUNTER — ASOB RESULT (OUTPATIENT)
Age: 49
End: 2024-05-09

## 2024-05-09 ENCOUNTER — APPOINTMENT (OUTPATIENT)
Dept: ANTEPARTUM | Facility: CLINIC | Age: 49
End: 2024-05-09

## 2024-05-09 PROCEDURE — 76817 TRANSVAGINAL US OBSTETRIC: CPT

## 2024-05-09 PROCEDURE — 76818 FETAL BIOPHYS PROFILE W/NST: CPT

## 2024-05-09 PROCEDURE — 99212 OFFICE O/P EST SF 10 MIN: CPT | Mod: 25

## 2024-05-09 PROCEDURE — 76816 OB US FOLLOW-UP PER FETUS: CPT

## 2024-05-10 ENCOUNTER — NON-APPOINTMENT (OUTPATIENT)
Age: 49
End: 2024-05-10

## 2024-05-10 ENCOUNTER — APPOINTMENT (OUTPATIENT)
Dept: OBGYN | Facility: CLINIC | Age: 49
End: 2024-05-10
Payer: MEDICAID

## 2024-05-10 VITALS
HEART RATE: 83 BPM | HEIGHT: 62 IN | SYSTOLIC BLOOD PRESSURE: 126 MMHG | DIASTOLIC BLOOD PRESSURE: 82 MMHG | BODY MASS INDEX: 36.07 KG/M2 | WEIGHT: 196 LBS

## 2024-05-10 PROCEDURE — 0502F SUBSEQUENT PRENATAL CARE: CPT

## 2024-05-15 ENCOUNTER — APPOINTMENT (OUTPATIENT)
Dept: MATERNAL FETAL MEDICINE | Facility: CLINIC | Age: 49
End: 2024-05-15

## 2024-05-17 ENCOUNTER — ASOB RESULT (OUTPATIENT)
Age: 49
End: 2024-05-17

## 2024-05-17 ENCOUNTER — APPOINTMENT (OUTPATIENT)
Dept: ANTEPARTUM | Facility: CLINIC | Age: 49
End: 2024-05-17
Payer: COMMERCIAL

## 2024-05-17 PROCEDURE — 76818 FETAL BIOPHYS PROFILE W/NST: CPT

## 2024-05-21 ENCOUNTER — APPOINTMENT (OUTPATIENT)
Dept: OBGYN | Facility: CLINIC | Age: 49
End: 2024-05-21
Payer: COMMERCIAL

## 2024-05-21 ENCOUNTER — NON-APPOINTMENT (OUTPATIENT)
Age: 49
End: 2024-05-21

## 2024-05-21 VITALS
BODY MASS INDEX: 35.88 KG/M2 | HEART RATE: 99 BPM | DIASTOLIC BLOOD PRESSURE: 80 MMHG | HEIGHT: 62 IN | SYSTOLIC BLOOD PRESSURE: 118 MMHG | WEIGHT: 195 LBS

## 2024-05-21 PROCEDURE — 0502F SUBSEQUENT PRENATAL CARE: CPT

## 2024-05-24 ENCOUNTER — ASOB RESULT (OUTPATIENT)
Age: 49
End: 2024-05-24

## 2024-05-24 ENCOUNTER — APPOINTMENT (OUTPATIENT)
Dept: ANTEPARTUM | Facility: CLINIC | Age: 49
End: 2024-05-24
Payer: COMMERCIAL

## 2024-05-24 PROCEDURE — 76818 FETAL BIOPHYS PROFILE W/NST: CPT

## 2024-05-31 ENCOUNTER — APPOINTMENT (OUTPATIENT)
Dept: ANTEPARTUM | Facility: CLINIC | Age: 49
End: 2024-05-31
Payer: COMMERCIAL

## 2024-05-31 ENCOUNTER — ASOB RESULT (OUTPATIENT)
Age: 49
End: 2024-05-31

## 2024-05-31 PROCEDURE — 76818 FETAL BIOPHYS PROFILE W/NST: CPT

## 2024-06-01 ENCOUNTER — NON-APPOINTMENT (OUTPATIENT)
Age: 49
End: 2024-06-01

## 2024-06-04 ENCOUNTER — APPOINTMENT (OUTPATIENT)
Dept: OBGYN | Facility: CLINIC | Age: 49
End: 2024-06-04

## 2024-06-04 ENCOUNTER — APPOINTMENT (OUTPATIENT)
Dept: OBGYN | Facility: CLINIC | Age: 49
End: 2024-06-04
Payer: COMMERCIAL

## 2024-06-04 VITALS
BODY MASS INDEX: 36.62 KG/M2 | DIASTOLIC BLOOD PRESSURE: 91 MMHG | SYSTOLIC BLOOD PRESSURE: 165 MMHG | HEART RATE: 79 BPM | HEIGHT: 62 IN | WEIGHT: 199 LBS

## 2024-06-04 PROCEDURE — 0502F SUBSEQUENT PRENATAL CARE: CPT

## 2024-06-07 ENCOUNTER — ASOB RESULT (OUTPATIENT)
Age: 49
End: 2024-06-07

## 2024-06-07 ENCOUNTER — APPOINTMENT (OUTPATIENT)
Dept: ANTEPARTUM | Facility: CLINIC | Age: 49
End: 2024-06-07
Payer: COMMERCIAL

## 2024-06-07 LAB
ALBUMIN SERPL ELPH-MCNC: 3.8 G/DL
ALP BLD-CCNC: 126 U/L
ALT SERPL-CCNC: 9 U/L
ANION GAP SERPL CALC-SCNC: 14 MMOL/L
AST SERPL-CCNC: 17 U/L
BILIRUB SERPL-MCNC: 0.2 MG/DL
BUN SERPL-MCNC: 12 MG/DL
CALCIUM SERPL-MCNC: 10.2 MG/DL
CHLORIDE SERPL-SCNC: 104 MMOL/L
CO2 SERPL-SCNC: 20 MMOL/L
CREAT SERPL-MCNC: 0.74 MG/DL
EGFR: 101 ML/MIN/1.73M2
GLUCOSE SERPL-MCNC: 70 MG/DL
GP B STREP DNA SPEC QL NAA+PROBE: NOT DETECTED
HCT VFR BLD CALC: 35.9 %
HGB BLD-MCNC: 11.8 G/DL
HIV1+2 AB SPEC QL IA.RAPID: NONREACTIVE
MCHC RBC-ENTMCNC: 28.7 PG
MCHC RBC-ENTMCNC: 32.9 GM/DL
MCV RBC AUTO: 87.3 FL
PLATELET # BLD AUTO: 280 K/UL
POTASSIUM SERPL-SCNC: 5.2 MMOL/L
PROT SERPL-MCNC: 7.1 G/DL
RBC # BLD: 4.11 M/UL
RBC # FLD: 13.5 %
SODIUM SERPL-SCNC: 137 MMOL/L
SOURCE GBS: NORMAL
WBC # FLD AUTO: 10.34 K/UL

## 2024-06-07 PROCEDURE — 76818 FETAL BIOPHYS PROFILE W/NST: CPT | Mod: 59

## 2024-06-07 PROCEDURE — 76816 OB US FOLLOW-UP PER FETUS: CPT

## 2024-06-07 PROCEDURE — 99215 OFFICE O/P EST HI 40 MIN: CPT | Mod: 25

## 2024-06-10 ENCOUNTER — NON-APPOINTMENT (OUTPATIENT)
Age: 49
End: 2024-06-10

## 2024-06-10 ENCOUNTER — APPOINTMENT (OUTPATIENT)
Dept: OBGYN | Facility: CLINIC | Age: 49
End: 2024-06-10
Payer: MEDICAID

## 2024-06-10 VITALS
SYSTOLIC BLOOD PRESSURE: 141 MMHG | WEIGHT: 202 LBS | DIASTOLIC BLOOD PRESSURE: 91 MMHG | HEIGHT: 62 IN | BODY MASS INDEX: 37.17 KG/M2

## 2024-06-10 DIAGNOSIS — I10 ESSENTIAL (PRIMARY) HYPERTENSION: ICD-10-CM

## 2024-06-10 DIAGNOSIS — O09.513 SUPERVISION OF ELDERLY PRIMIGRAVIDA, THIRD TRIMESTER: ICD-10-CM

## 2024-06-10 DIAGNOSIS — O24.419 GESTATIONAL DIABETES MELLITUS IN PREGNANCY, UNSPECIFIED CONTROL: ICD-10-CM

## 2024-06-10 PROCEDURE — 0502F SUBSEQUENT PRENATAL CARE: CPT

## 2024-06-13 ENCOUNTER — APPOINTMENT (OUTPATIENT)
Dept: ANTEPARTUM | Facility: CLINIC | Age: 49
End: 2024-06-13
Payer: COMMERCIAL

## 2024-06-13 ENCOUNTER — ASOB RESULT (OUTPATIENT)
Age: 49
End: 2024-06-13

## 2024-06-13 PROCEDURE — 76818 FETAL BIOPHYS PROFILE W/NST: CPT

## 2024-06-15 ENCOUNTER — NON-APPOINTMENT (OUTPATIENT)
Age: 49
End: 2024-06-15

## 2024-06-17 ENCOUNTER — ASOB RESULT (OUTPATIENT)
Age: 49
End: 2024-06-17

## 2024-06-17 ENCOUNTER — APPOINTMENT (OUTPATIENT)
Dept: ANTEPARTUM | Facility: CLINIC | Age: 49
End: 2024-06-17

## 2024-06-17 ENCOUNTER — APPOINTMENT (OUTPATIENT)
Dept: ANTEPARTUM | Facility: CLINIC | Age: 49
End: 2024-06-17
Payer: COMMERCIAL

## 2024-06-17 PROCEDURE — 76818 FETAL BIOPHYS PROFILE W/NST: CPT

## 2024-06-18 ENCOUNTER — APPOINTMENT (OUTPATIENT)
Dept: OBGYN | Facility: CLINIC | Age: 49
End: 2024-06-18

## 2024-06-18 ENCOUNTER — INPATIENT (INPATIENT)
Facility: HOSPITAL | Age: 49
LOS: 1 days | Discharge: HOME | End: 2024-06-20
Attending: STUDENT IN AN ORGANIZED HEALTH CARE EDUCATION/TRAINING PROGRAM | Admitting: STUDENT IN AN ORGANIZED HEALTH CARE EDUCATION/TRAINING PROGRAM
Payer: COMMERCIAL

## 2024-06-18 VITALS
HEART RATE: 72 BPM | DIASTOLIC BLOOD PRESSURE: 83 MMHG | SYSTOLIC BLOOD PRESSURE: 131 MMHG | TEMPERATURE: 98 F | RESPIRATION RATE: 16 BRPM

## 2024-06-18 DIAGNOSIS — O26.899 OTHER SPECIFIED PREGNANCY RELATED CONDITIONS, UNSPECIFIED TRIMESTER: ICD-10-CM

## 2024-06-18 DIAGNOSIS — Z98.84 BARIATRIC SURGERY STATUS: Chronic | ICD-10-CM

## 2024-06-18 LAB
ALBUMIN SERPL ELPH-MCNC: 3.3 G/DL — SIGNIFICANT CHANGE UP (ref 3.3–5)
ALBUMIN SERPL ELPH-MCNC: 3.3 G/DL — SIGNIFICANT CHANGE UP (ref 3.3–5)
ALP SERPL-CCNC: 124 U/L — HIGH (ref 40–120)
ALP SERPL-CCNC: 132 U/L — HIGH (ref 40–120)
ALT FLD-CCNC: 12 U/L — SIGNIFICANT CHANGE UP (ref 4–33)
ALT FLD-CCNC: 19 U/L — SIGNIFICANT CHANGE UP (ref 4–33)
ANION GAP SERPL CALC-SCNC: 10 MMOL/L — SIGNIFICANT CHANGE UP (ref 7–14)
ANION GAP SERPL CALC-SCNC: 15 MMOL/L — HIGH (ref 7–14)
AST SERPL-CCNC: 16 U/L — SIGNIFICANT CHANGE UP (ref 4–32)
AST SERPL-CCNC: 56 U/L — HIGH (ref 4–32)
BASOPHILS # BLD AUTO: 0.02 K/UL — SIGNIFICANT CHANGE UP (ref 0–0.2)
BASOPHILS # BLD AUTO: 0.03 K/UL — SIGNIFICANT CHANGE UP (ref 0–0.2)
BASOPHILS NFR BLD AUTO: 0.2 % — SIGNIFICANT CHANGE UP (ref 0–2)
BASOPHILS NFR BLD AUTO: 0.4 % — SIGNIFICANT CHANGE UP (ref 0–2)
BILIRUB SERPL-MCNC: 0.2 MG/DL — SIGNIFICANT CHANGE UP (ref 0.2–1.2)
BILIRUB SERPL-MCNC: 0.3 MG/DL — SIGNIFICANT CHANGE UP (ref 0.2–1.2)
BLD GP AB SCN SERPL QL: POSITIVE — SIGNIFICANT CHANGE UP
BUN SERPL-MCNC: 11 MG/DL — SIGNIFICANT CHANGE UP (ref 7–23)
BUN SERPL-MCNC: 14 MG/DL — SIGNIFICANT CHANGE UP (ref 7–23)
CALCIUM SERPL-MCNC: 8.8 MG/DL — SIGNIFICANT CHANGE UP (ref 8.4–10.5)
CALCIUM SERPL-MCNC: 9.1 MG/DL — SIGNIFICANT CHANGE UP (ref 8.4–10.5)
CHLORIDE SERPL-SCNC: 105 MMOL/L — SIGNIFICANT CHANGE UP (ref 98–107)
CHLORIDE SERPL-SCNC: 105 MMOL/L — SIGNIFICANT CHANGE UP (ref 98–107)
CO2 SERPL-SCNC: 14 MMOL/L — LOW (ref 22–31)
CO2 SERPL-SCNC: 20 MMOL/L — LOW (ref 22–31)
CREAT ?TM UR-MCNC: 116 MG/DL — SIGNIFICANT CHANGE UP
CREAT SERPL-MCNC: 0.54 MG/DL — SIGNIFICANT CHANGE UP (ref 0.5–1.3)
CREAT SERPL-MCNC: 0.65 MG/DL — SIGNIFICANT CHANGE UP (ref 0.5–1.3)
EGFR: 110 ML/MIN/1.73M2 — SIGNIFICANT CHANGE UP
EGFR: 115 ML/MIN/1.73M2 — SIGNIFICANT CHANGE UP
EOSINOPHIL # BLD AUTO: 0.09 K/UL — SIGNIFICANT CHANGE UP (ref 0–0.5)
EOSINOPHIL # BLD AUTO: 0.14 K/UL — SIGNIFICANT CHANGE UP (ref 0–0.5)
EOSINOPHIL NFR BLD AUTO: 1.2 % — SIGNIFICANT CHANGE UP (ref 0–6)
EOSINOPHIL NFR BLD AUTO: 1.6 % — SIGNIFICANT CHANGE UP (ref 0–6)
GLUCOSE BLDC GLUCOMTR-MCNC: 78 MG/DL — SIGNIFICANT CHANGE UP (ref 70–99)
GLUCOSE BLDC GLUCOMTR-MCNC: 89 MG/DL — SIGNIFICANT CHANGE UP (ref 70–99)
GLUCOSE BLDC GLUCOMTR-MCNC: 91 MG/DL — SIGNIFICANT CHANGE UP (ref 70–99)
GLUCOSE BLDC GLUCOMTR-MCNC: 91 MG/DL — SIGNIFICANT CHANGE UP (ref 70–99)
GLUCOSE BLDC GLUCOMTR-MCNC: 92 MG/DL — SIGNIFICANT CHANGE UP (ref 70–99)
GLUCOSE SERPL-MCNC: 71 MG/DL — SIGNIFICANT CHANGE UP (ref 70–99)
GLUCOSE SERPL-MCNC: 82 MG/DL — SIGNIFICANT CHANGE UP (ref 70–99)
HCT VFR BLD CALC: 31 % — LOW (ref 34.5–45)
HCT VFR BLD CALC: 33.3 % — LOW (ref 34.5–45)
HCV AB S/CO SERPL IA: 0.1 S/CO — SIGNIFICANT CHANGE UP (ref 0–0.99)
HCV AB SERPL-IMP: SIGNIFICANT CHANGE UP
HGB BLD-MCNC: 10.3 G/DL — LOW (ref 11.5–15.5)
HGB BLD-MCNC: 11 G/DL — LOW (ref 11.5–15.5)
IANC: 6 K/UL — SIGNIFICANT CHANGE UP (ref 1.8–7.4)
IANC: 6.27 K/UL — SIGNIFICANT CHANGE UP (ref 1.8–7.4)
IMM GRANULOCYTES NFR BLD AUTO: 0.8 % — SIGNIFICANT CHANGE UP (ref 0–0.9)
IMM GRANULOCYTES NFR BLD AUTO: 1 % — HIGH (ref 0–0.9)
LDH SERPL L TO P-CCNC: 167 U/L — SIGNIFICANT CHANGE UP (ref 135–225)
LDH SERPL L TO P-CCNC: 645 U/L — HIGH (ref 135–225)
LYMPHOCYTES # BLD AUTO: 1.17 K/UL — SIGNIFICANT CHANGE UP (ref 1–3.3)
LYMPHOCYTES # BLD AUTO: 1.56 K/UL — SIGNIFICANT CHANGE UP (ref 1–3.3)
LYMPHOCYTES # BLD AUTO: 15.1 % — SIGNIFICANT CHANGE UP (ref 13–44)
LYMPHOCYTES # BLD AUTO: 18 % — SIGNIFICANT CHANGE UP (ref 13–44)
MCHC RBC-ENTMCNC: 28.5 PG — SIGNIFICANT CHANGE UP (ref 27–34)
MCHC RBC-ENTMCNC: 28.7 PG — SIGNIFICANT CHANGE UP (ref 27–34)
MCHC RBC-ENTMCNC: 33 GM/DL — SIGNIFICANT CHANGE UP (ref 32–36)
MCHC RBC-ENTMCNC: 33.2 GM/DL — SIGNIFICANT CHANGE UP (ref 32–36)
MCV RBC AUTO: 86.3 FL — SIGNIFICANT CHANGE UP (ref 80–100)
MCV RBC AUTO: 86.4 FL — SIGNIFICANT CHANGE UP (ref 80–100)
MONOCYTES # BLD AUTO: 0.42 K/UL — SIGNIFICANT CHANGE UP (ref 0–0.9)
MONOCYTES # BLD AUTO: 0.58 K/UL — SIGNIFICANT CHANGE UP (ref 0–0.9)
MONOCYTES NFR BLD AUTO: 5.4 % — SIGNIFICANT CHANGE UP (ref 2–14)
MONOCYTES NFR BLD AUTO: 6.7 % — SIGNIFICANT CHANGE UP (ref 2–14)
NEUTROPHILS # BLD AUTO: 6 K/UL — SIGNIFICANT CHANGE UP (ref 1.8–7.4)
NEUTROPHILS # BLD AUTO: 6.27 K/UL — SIGNIFICANT CHANGE UP (ref 1.8–7.4)
NEUTROPHILS NFR BLD AUTO: 72.5 % — SIGNIFICANT CHANGE UP (ref 43–77)
NEUTROPHILS NFR BLD AUTO: 77.1 % — HIGH (ref 43–77)
NRBC # BLD: 0 /100 WBCS — SIGNIFICANT CHANGE UP (ref 0–0)
NRBC # BLD: 0 /100 WBCS — SIGNIFICANT CHANGE UP (ref 0–0)
NRBC # FLD: 0 K/UL — SIGNIFICANT CHANGE UP (ref 0–0)
NRBC # FLD: 0 K/UL — SIGNIFICANT CHANGE UP (ref 0–0)
PLATELET # BLD AUTO: 245 K/UL — SIGNIFICANT CHANGE UP (ref 150–400)
PLATELET # BLD AUTO: 252 K/UL — SIGNIFICANT CHANGE UP (ref 150–400)
POTASSIUM SERPL-MCNC: 3.9 MMOL/L — SIGNIFICANT CHANGE UP (ref 3.5–5.3)
POTASSIUM SERPL-MCNC: 5.9 MMOL/L — HIGH (ref 3.5–5.3)
POTASSIUM SERPL-SCNC: 3.9 MMOL/L — SIGNIFICANT CHANGE UP (ref 3.5–5.3)
POTASSIUM SERPL-SCNC: 5.9 MMOL/L — HIGH (ref 3.5–5.3)
PROT ?TM UR-MCNC: 22 MG/DL — SIGNIFICANT CHANGE UP
PROT SERPL-MCNC: 6.5 G/DL — SIGNIFICANT CHANGE UP (ref 6–8.3)
PROT SERPL-MCNC: 7 G/DL — SIGNIFICANT CHANGE UP (ref 6–8.3)
PROT/CREAT UR-RTO: 0.2 RATIO — SIGNIFICANT CHANGE UP (ref 0–0.2)
RBC # BLD: 3.59 M/UL — LOW (ref 3.8–5.2)
RBC # BLD: 3.86 M/UL — SIGNIFICANT CHANGE UP (ref 3.8–5.2)
RBC # FLD: 13.5 % — SIGNIFICANT CHANGE UP (ref 10.3–14.5)
RBC # FLD: 14 % — SIGNIFICANT CHANGE UP (ref 10.3–14.5)
RH IG SCN BLD-IMP: NEGATIVE — SIGNIFICANT CHANGE UP
SODIUM SERPL-SCNC: 134 MMOL/L — LOW (ref 135–145)
SODIUM SERPL-SCNC: 135 MMOL/L — SIGNIFICANT CHANGE UP (ref 135–145)
T PALLIDUM AB TITR SER: NEGATIVE — SIGNIFICANT CHANGE UP
URATE SERPL-MCNC: 3.9 MG/DL — SIGNIFICANT CHANGE UP (ref 2.5–7)
URATE SERPL-MCNC: 4.1 MG/DL — SIGNIFICANT CHANGE UP (ref 2.5–7)
WBC # BLD: 7.77 K/UL — SIGNIFICANT CHANGE UP (ref 3.8–10.5)
WBC # BLD: 8.66 K/UL — SIGNIFICANT CHANGE UP (ref 3.8–10.5)
WBC # FLD AUTO: 7.77 K/UL — SIGNIFICANT CHANGE UP (ref 3.8–10.5)
WBC # FLD AUTO: 8.66 K/UL — SIGNIFICANT CHANGE UP (ref 3.8–10.5)

## 2024-06-18 PROCEDURE — 86077 PHYS BLOOD BANK SERV XMATCH: CPT

## 2024-06-18 PROCEDURE — 59515 CESAREAN DELIVERY: CPT

## 2024-06-18 RX ORDER — LANOLIN
1 WAX (GRAM) MISCELLANEOUS EVERY 6 HOURS
Refills: 0 | Status: DISCONTINUED | OUTPATIENT
Start: 2024-06-18 | End: 2024-06-20

## 2024-06-18 RX ORDER — HEPARIN SODIUM 50 [USP'U]/ML
5000 INJECTION, SOLUTION INTRAVENOUS EVERY 12 HOURS
Refills: 0 | Status: DISCONTINUED | OUTPATIENT
Start: 2024-06-18 | End: 2024-06-20

## 2024-06-18 RX ORDER — KETOROLAC TROMETHAMINE 30 MG/ML
30 INJECTION, SOLUTION INTRAMUSCULAR EVERY 6 HOURS
Refills: 0 | Status: DISCONTINUED | OUTPATIENT
Start: 2024-06-18 | End: 2024-06-19

## 2024-06-18 RX ORDER — OXYTOCIN 30 [USP'U]/500ML
333.33 INJECTION, SOLUTION INTRAVENOUS
Qty: 20 | Refills: 0 | Status: DISCONTINUED | OUTPATIENT
Start: 2024-06-18 | End: 2024-06-18

## 2024-06-18 RX ORDER — TETANUS TOXOID, REDUCED DIPHTHERIA TOXOID AND ACELLULAR PERTUSSIS VACCINE, ADSORBED 5; 2.5; 8; 8; 2.5 [IU]/.5ML; [IU]/.5ML; UG/.5ML; UG/.5ML; UG/.5ML
0.5 SUSPENSION INTRAMUSCULAR ONCE
Refills: 0 | Status: DISCONTINUED | OUTPATIENT
Start: 2024-06-18 | End: 2024-06-20

## 2024-06-18 RX ORDER — SODIUM CHLORIDE 0.9 % (FLUSH) 0.9 %
1000 SYRINGE (ML) INJECTION
Refills: 0 | Status: DISCONTINUED | OUTPATIENT
Start: 2024-06-18 | End: 2024-06-18

## 2024-06-18 RX ORDER — DEXTROSE MONOHYDRATE AND SODIUM CHLORIDE 5; .3 G/100ML; G/100ML
1000 INJECTION, SOLUTION INTRAVENOUS
Refills: 0 | Status: DISCONTINUED | OUTPATIENT
Start: 2024-06-18 | End: 2024-06-18

## 2024-06-18 RX ORDER — SIMETHICONE 40MG/0.6ML
80 SUSPENSION, DROPS(FINAL DOSAGE FORM)(ML) ORAL EVERY 4 HOURS
Refills: 0 | Status: DISCONTINUED | OUTPATIENT
Start: 2024-06-18 | End: 2024-06-20

## 2024-06-18 RX ORDER — DEXTROSE MONOHYDRATE AND SODIUM CHLORIDE 5; .3 G/100ML; G/100ML
1000 INJECTION, SOLUTION INTRAVENOUS
Refills: 0 | Status: DISCONTINUED | OUTPATIENT
Start: 2024-06-18 | End: 2024-06-20

## 2024-06-18 RX ORDER — OXYTOCIN 30 [USP'U]/500ML
333.33 INJECTION, SOLUTION INTRAVENOUS
Qty: 20 | Refills: 0 | Status: DISCONTINUED | OUTPATIENT
Start: 2024-06-18 | End: 2024-06-19

## 2024-06-18 RX ORDER — OXYCODONE HYDROCHLORIDE 100 MG/5ML
5 SOLUTION ORAL
Refills: 0 | Status: DISCONTINUED | OUTPATIENT
Start: 2024-06-18 | End: 2024-06-20

## 2024-06-18 RX ORDER — TRISODIUM CITRATE DIHYDRATE AND CITRIC ACID MONOHYDRATE 500; 334 MG/5ML; MG/5ML
15 SOLUTION ORAL EVERY 6 HOURS
Refills: 0 | Status: DISCONTINUED | OUTPATIENT
Start: 2024-06-18 | End: 2024-06-18

## 2024-06-18 RX ORDER — SODIUM CHLORIDE 0.9 % (FLUSH) 0.9 %
1000 SYRINGE (ML) INJECTION
Refills: 0 | Status: DISCONTINUED | OUTPATIENT
Start: 2024-06-18 | End: 2024-06-19

## 2024-06-18 RX ORDER — LABETALOL HYDROCHLORIDE 300 MG/1
100 TABLET ORAL EVERY 12 HOURS
Refills: 0 | Status: DISCONTINUED | OUTPATIENT
Start: 2024-06-18 | End: 2024-06-20

## 2024-06-18 RX ORDER — TRISODIUM CITRATE DIHYDRATE AND CITRIC ACID MONOHYDRATE 500; 334 MG/5ML; MG/5ML
15 SOLUTION ORAL EVERY 6 HOURS
Refills: 0 | Status: DISCONTINUED | OUTPATIENT
Start: 2024-06-18 | End: 2024-06-19

## 2024-06-18 RX ORDER — DIPHENHYDRAMINE HCL 12.5MG/5ML
25 ELIXIR ORAL EVERY 6 HOURS
Refills: 0 | Status: DISCONTINUED | OUTPATIENT
Start: 2024-06-18 | End: 2024-06-20

## 2024-06-18 RX ORDER — ACETAMINOPHEN 325 MG
975 TABLET ORAL
Refills: 0 | Status: DISCONTINUED | OUTPATIENT
Start: 2024-06-18 | End: 2024-06-20

## 2024-06-18 RX ORDER — DEXTROSE MONOHYDRATE AND SODIUM CHLORIDE 5; .3 G/100ML; G/100ML
1000 INJECTION, SOLUTION INTRAVENOUS
Refills: 0 | Status: DISCONTINUED | OUTPATIENT
Start: 2024-06-18 | End: 2024-06-19

## 2024-06-18 RX ORDER — OXYCODONE HYDROCHLORIDE 100 MG/5ML
5 SOLUTION ORAL ONCE
Refills: 0 | Status: DISCONTINUED | OUTPATIENT
Start: 2024-06-18 | End: 2024-06-20

## 2024-06-18 RX ADMIN — DEXTROSE MONOHYDRATE AND SODIUM CHLORIDE 125 MILLILITER(S): 5; .3 INJECTION, SOLUTION INTRAVENOUS at 04:49

## 2024-06-18 RX ADMIN — DEXTROSE MONOHYDRATE AND SODIUM CHLORIDE 125 MILLILITER(S): 5; .3 INJECTION, SOLUTION INTRAVENOUS at 07:22

## 2024-06-18 RX ADMIN — Medication 1 APPLICATION(S): at 04:00

## 2024-06-18 RX ADMIN — LABETALOL HYDROCHLORIDE 100 MILLIGRAM(S): 300 TABLET ORAL at 23:49

## 2024-06-18 RX ADMIN — LABETALOL HYDROCHLORIDE 100 MILLIGRAM(S): 300 TABLET ORAL at 10:06

## 2024-06-19 ENCOUNTER — TRANSCRIPTION ENCOUNTER (OUTPATIENT)
Age: 49
End: 2024-06-19

## 2024-06-19 LAB
ALBUMIN SERPL ELPH-MCNC: 2.7 G/DL — LOW (ref 3.3–5)
ALP SERPL-CCNC: 98 U/L — SIGNIFICANT CHANGE UP (ref 40–120)
ALT FLD-CCNC: 10 U/L — SIGNIFICANT CHANGE UP (ref 4–33)
ANION GAP SERPL CALC-SCNC: 11 MMOL/L — SIGNIFICANT CHANGE UP (ref 7–14)
AST SERPL-CCNC: 21 U/L — SIGNIFICANT CHANGE UP (ref 4–32)
BASOPHILS # BLD AUTO: 0.02 K/UL — SIGNIFICANT CHANGE UP (ref 0–0.2)
BASOPHILS NFR BLD AUTO: 0.1 % — SIGNIFICANT CHANGE UP (ref 0–2)
BILIRUB SERPL-MCNC: <0.2 MG/DL — SIGNIFICANT CHANGE UP (ref 0.2–1.2)
BUN SERPL-MCNC: 11 MG/DL — SIGNIFICANT CHANGE UP (ref 7–23)
CALCIUM SERPL-MCNC: 8.3 MG/DL — LOW (ref 8.4–10.5)
CHLORIDE SERPL-SCNC: 107 MMOL/L — SIGNIFICANT CHANGE UP (ref 98–107)
CO2 SERPL-SCNC: 18 MMOL/L — LOW (ref 22–31)
CREAT SERPL-MCNC: 0.72 MG/DL — SIGNIFICANT CHANGE UP (ref 0.5–1.3)
EGFR: 104 ML/MIN/1.73M2 — SIGNIFICANT CHANGE UP
EOSINOPHIL # BLD AUTO: 0 K/UL — SIGNIFICANT CHANGE UP (ref 0–0.5)
EOSINOPHIL NFR BLD AUTO: 0 % — SIGNIFICANT CHANGE UP (ref 0–6)
GLUCOSE SERPL-MCNC: 161 MG/DL — HIGH (ref 70–99)
HCT VFR BLD CALC: 28.3 % — LOW (ref 34.5–45)
HGB BLD-MCNC: 9.3 G/DL — LOW (ref 11.5–15.5)
IANC: 14.35 K/UL — HIGH (ref 1.8–7.4)
IMM GRANULOCYTES NFR BLD AUTO: 0.6 % — SIGNIFICANT CHANGE UP (ref 0–0.9)
LDH SERPL L TO P-CCNC: 227 U/L — HIGH (ref 135–225)
LYMPHOCYTES # BLD AUTO: 0.5 K/UL — LOW (ref 1–3.3)
LYMPHOCYTES # BLD AUTO: 3.2 % — LOW (ref 13–44)
MCHC RBC-ENTMCNC: 28.6 PG — SIGNIFICANT CHANGE UP (ref 27–34)
MCHC RBC-ENTMCNC: 32.9 GM/DL — SIGNIFICANT CHANGE UP (ref 32–36)
MCV RBC AUTO: 87.1 FL — SIGNIFICANT CHANGE UP (ref 80–100)
MONOCYTES # BLD AUTO: 0.62 K/UL — SIGNIFICANT CHANGE UP (ref 0–0.9)
MONOCYTES NFR BLD AUTO: 4 % — SIGNIFICANT CHANGE UP (ref 2–14)
NEUTROPHILS # BLD AUTO: 14.35 K/UL — HIGH (ref 1.8–7.4)
NEUTROPHILS NFR BLD AUTO: 92.1 % — HIGH (ref 43–77)
NRBC # BLD: 0 /100 WBCS — SIGNIFICANT CHANGE UP (ref 0–0)
NRBC # FLD: 0 K/UL — SIGNIFICANT CHANGE UP (ref 0–0)
PLATELET # BLD AUTO: 200 K/UL — SIGNIFICANT CHANGE UP (ref 150–400)
POTASSIUM SERPL-MCNC: 4.1 MMOL/L — SIGNIFICANT CHANGE UP (ref 3.5–5.3)
POTASSIUM SERPL-SCNC: 4.1 MMOL/L — SIGNIFICANT CHANGE UP (ref 3.5–5.3)
PROT SERPL-MCNC: 5 G/DL — LOW (ref 6–8.3)
RBC # BLD: 3.25 M/UL — LOW (ref 3.8–5.2)
RBC # FLD: 13.9 % — SIGNIFICANT CHANGE UP (ref 10.3–14.5)
SODIUM SERPL-SCNC: 136 MMOL/L — SIGNIFICANT CHANGE UP (ref 135–145)
URATE SERPL-MCNC: 4.6 MG/DL — SIGNIFICANT CHANGE UP (ref 2.5–7)
WBC # BLD: 15.59 K/UL — HIGH (ref 3.8–10.5)
WBC # FLD AUTO: 15.59 K/UL — HIGH (ref 3.8–10.5)

## 2024-06-19 RX ORDER — CHOLECALCIFEROL (VITAMIN D3) 125 MCG
1 CAPSULE ORAL
Qty: 0 | Refills: 0 | DISCHARGE

## 2024-06-19 RX ORDER — SENNOSIDES 8.6 MG
2 TABLET ORAL AT BEDTIME
Refills: 0 | Status: DISCONTINUED | OUTPATIENT
Start: 2024-06-19 | End: 2024-06-20

## 2024-06-19 RX ORDER — ACETAMINOPHEN 325 MG
3 TABLET ORAL
Qty: 0 | Refills: 0 | DISCHARGE
Start: 2024-06-19

## 2024-06-19 RX ORDER — LABETALOL HYDROCHLORIDE 300 MG/1
1 TABLET ORAL
Qty: 0 | Refills: 0 | DISCHARGE
Start: 2024-06-19

## 2024-06-19 RX ORDER — PRENATAL VIT/IRON FUM/FOLIC AC 60 MG-1 MG
1 TABLET ORAL DAILY
Refills: 0 | Status: DISCONTINUED | OUTPATIENT
Start: 2024-06-19 | End: 2024-06-20

## 2024-06-19 RX ORDER — FERROUS SULFATE 325(65) MG
325 TABLET ORAL
Refills: 0 | Status: DISCONTINUED | OUTPATIENT
Start: 2024-06-19 | End: 2024-06-20

## 2024-06-19 RX ADMIN — KETOROLAC TROMETHAMINE 30 MILLIGRAM(S): 30 INJECTION, SOLUTION INTRAMUSCULAR at 17:32

## 2024-06-19 RX ADMIN — Medication 80 MILLIGRAM(S): at 17:33

## 2024-06-19 RX ADMIN — HEPARIN SODIUM 5000 UNIT(S): 50 INJECTION, SOLUTION INTRAVENOUS at 05:27

## 2024-06-19 RX ADMIN — KETOROLAC TROMETHAMINE 30 MILLIGRAM(S): 30 INJECTION, SOLUTION INTRAMUSCULAR at 05:55

## 2024-06-19 RX ADMIN — KETOROLAC TROMETHAMINE 30 MILLIGRAM(S): 30 INJECTION, SOLUTION INTRAMUSCULAR at 11:29

## 2024-06-19 RX ADMIN — KETOROLAC TROMETHAMINE 30 MILLIGRAM(S): 30 INJECTION, SOLUTION INTRAMUSCULAR at 12:30

## 2024-06-19 RX ADMIN — KETOROLAC TROMETHAMINE 30 MILLIGRAM(S): 30 INJECTION, SOLUTION INTRAMUSCULAR at 05:27

## 2024-06-19 RX ADMIN — KETOROLAC TROMETHAMINE 30 MILLIGRAM(S): 30 INJECTION, SOLUTION INTRAMUSCULAR at 18:26

## 2024-06-19 RX ADMIN — LABETALOL HYDROCHLORIDE 100 MILLIGRAM(S): 300 TABLET ORAL at 14:10

## 2024-06-19 RX ADMIN — Medication 600 MILLIGRAM(S): at 23:22

## 2024-06-19 RX ADMIN — HEPARIN SODIUM 5000 UNIT(S): 50 INJECTION, SOLUTION INTRAVENOUS at 17:32

## 2024-06-19 RX ADMIN — Medication 2 TABLET(S): at 23:22

## 2024-06-20 VITALS
HEART RATE: 81 BPM | TEMPERATURE: 99 F | RESPIRATION RATE: 17 BRPM | OXYGEN SATURATION: 98 % | SYSTOLIC BLOOD PRESSURE: 132 MMHG | DIASTOLIC BLOOD PRESSURE: 73 MMHG

## 2024-06-20 LAB — KLEIHAUER-BETKE CALCULATION: 0 % — SIGNIFICANT CHANGE UP (ref 0–0.2)

## 2024-06-20 RX ADMIN — Medication 600 MILLIGRAM(S): at 05:45

## 2024-06-20 RX ADMIN — LABETALOL HYDROCHLORIDE 100 MILLIGRAM(S): 300 TABLET ORAL at 01:39

## 2024-06-20 RX ADMIN — Medication 600 MILLIGRAM(S): at 12:33

## 2024-06-20 RX ADMIN — Medication 975 MILLIGRAM(S): at 08:11

## 2024-06-20 RX ADMIN — Medication 1 TABLET(S): at 13:13

## 2024-06-20 RX ADMIN — Medication 975 MILLIGRAM(S): at 02:30

## 2024-06-20 RX ADMIN — Medication 600 MILLIGRAM(S): at 00:01

## 2024-06-20 RX ADMIN — LABETALOL HYDROCHLORIDE 100 MILLIGRAM(S): 300 TABLET ORAL at 13:24

## 2024-06-20 RX ADMIN — Medication 600 MILLIGRAM(S): at 13:12

## 2024-06-20 RX ADMIN — Medication 975 MILLIGRAM(S): at 09:11

## 2024-06-20 RX ADMIN — HEPARIN SODIUM 5000 UNIT(S): 50 INJECTION, SOLUTION INTRAVENOUS at 05:45

## 2024-06-20 RX ADMIN — Medication 975 MILLIGRAM(S): at 13:24

## 2024-06-20 RX ADMIN — Medication 975 MILLIGRAM(S): at 01:40

## 2024-06-20 RX ADMIN — Medication 600 MILLIGRAM(S): at 06:25

## 2024-06-20 RX ADMIN — Medication 500 MILLIGRAM(S): at 12:33

## 2024-06-20 RX ADMIN — Medication 975 MILLIGRAM(S): at 14:20

## 2024-06-21 ENCOUNTER — NON-APPOINTMENT (OUTPATIENT)
Age: 49
End: 2024-06-21

## 2024-06-21 DIAGNOSIS — I10 ESSENTIAL (PRIMARY) HYPERTENSION: ICD-10-CM

## 2024-06-21 RX ORDER — IBUPROFEN 600 MG/1
600 TABLET, FILM COATED ORAL EVERY 6 HOURS
Refills: 0 | Status: ACTIVE | COMMUNITY
Start: 2024-06-21

## 2024-06-21 RX ORDER — LANCETS 33 GAUGE
EACH MISCELLANEOUS
Qty: 4 | Refills: 0 | Status: DISCONTINUED | COMMUNITY
Start: 2024-04-10 | End: 2024-06-21

## 2024-06-21 RX ORDER — LABETALOL HYDROCHLORIDE 100 MG/1
100 TABLET, FILM COATED ORAL
Refills: 0 | Status: ACTIVE | COMMUNITY
Start: 2024-06-21

## 2024-06-21 RX ORDER — FAMOTIDINE 20 MG/1
20 TABLET, FILM COATED ORAL DAILY
Qty: 30 | Refills: 6 | Status: DISCONTINUED | COMMUNITY
Start: 2024-04-23 | End: 2024-06-21

## 2024-06-21 RX ORDER — PEN NEEDLE, DIABETIC 29 G X1/2"
32G X 4 MM NEEDLE, DISPOSABLE MISCELLANEOUS
Qty: 2 | Refills: 0 | Status: DISCONTINUED | COMMUNITY
Start: 2024-04-18 | End: 2024-06-21

## 2024-06-21 RX ORDER — ISOPROPYL ALCOHOL 0.7 ML/ML
SWAB TOPICAL
Qty: 2 | Refills: 1 | Status: DISCONTINUED | COMMUNITY
Start: 2024-04-18 | End: 2024-06-21

## 2024-06-21 RX ORDER — ACETAMINOPHEN 325 MG
TABLET ORAL
Refills: 0 | Status: DISCONTINUED | COMMUNITY
End: 2024-06-21

## 2024-06-21 RX ORDER — ACETAMINOPHEN 500 MG/1
500 TABLET ORAL
Refills: 0 | Status: ACTIVE | COMMUNITY
Start: 2024-06-21

## 2024-06-21 RX ORDER — ASPIRIN 81 MG
81 TABLET, DELAYED RELEASE (ENTERIC COATED) ORAL
Refills: 0 | Status: DISCONTINUED | COMMUNITY
End: 2024-06-21

## 2024-06-21 RX ORDER — LABETALOL HYDROCHLORIDE 100 MG/1
100 TABLET, FILM COATED ORAL
Refills: 0 | Status: DISCONTINUED | COMMUNITY
End: 2024-06-21

## 2024-06-21 RX ORDER — PSYLLIUM HUSK 0.4 G
CAPSULE ORAL
Refills: 0 | Status: DISCONTINUED | COMMUNITY
End: 2024-06-21

## 2024-06-21 RX ORDER — URINE ACETONE TEST STRIPS
STRIP MISCELLANEOUS
Qty: 1 | Refills: 0 | Status: DISCONTINUED | COMMUNITY
Start: 2024-04-10 | End: 2024-06-21

## 2024-06-21 RX ORDER — BLOOD-GLUCOSE METER
W/DEVICE KIT MISCELLANEOUS
Qty: 1 | Refills: 0 | Status: DISCONTINUED | COMMUNITY
Start: 2024-04-10 | End: 2024-06-21

## 2024-06-21 RX ORDER — AMMONIUM LACTATE 12 %
12 CREAM (GRAM) TOPICAL TWICE DAILY
Qty: 1 | Refills: 3 | Status: ACTIVE | COMMUNITY
Start: 2018-02-08 | End: 1900-01-01

## 2024-06-21 RX ORDER — BLOOD-GLUCOSE METER
KIT MISCELLANEOUS 4 TIMES DAILY
Qty: 4 | Refills: 2 | Status: DISCONTINUED | COMMUNITY
Start: 2024-04-10 | End: 2024-06-21

## 2024-06-21 RX ORDER — INSULIN HUMAN 100 [IU]/ML
100 INJECTION, SUSPENSION SUBCUTANEOUS
Qty: 1 | Refills: 1 | Status: DISCONTINUED | COMMUNITY
Start: 2024-04-18 | End: 2024-06-21

## 2024-06-22 ENCOUNTER — EMERGENCY (EMERGENCY)
Facility: HOSPITAL | Age: 49
LOS: 1 days | Discharge: LEFT BEFORE DISCHARGE | End: 2024-06-22
Attending: STUDENT IN AN ORGANIZED HEALTH CARE EDUCATION/TRAINING PROGRAM | Admitting: STUDENT IN AN ORGANIZED HEALTH CARE EDUCATION/TRAINING PROGRAM
Payer: COMMERCIAL

## 2024-06-22 VITALS
SYSTOLIC BLOOD PRESSURE: 149 MMHG | HEART RATE: 83 BPM | WEIGHT: 194.01 LBS | HEIGHT: 62 IN | TEMPERATURE: 99 F | RESPIRATION RATE: 16 BRPM | OXYGEN SATURATION: 99 % | DIASTOLIC BLOOD PRESSURE: 78 MMHG

## 2024-06-22 VITALS
OXYGEN SATURATION: 99 % | RESPIRATION RATE: 16 BRPM | HEART RATE: 86 BPM | DIASTOLIC BLOOD PRESSURE: 89 MMHG | SYSTOLIC BLOOD PRESSURE: 137 MMHG

## 2024-06-22 DIAGNOSIS — Z98.84 BARIATRIC SURGERY STATUS: Chronic | ICD-10-CM

## 2024-06-22 PROCEDURE — 99283 EMERGENCY DEPT VISIT LOW MDM: CPT

## 2024-06-22 NOTE — ED PROVIDER NOTE - OBJECTIVE STATEMENT
48-year-old female status post recent  presents ED for evaluation of rash over area in the back where epidural was placed and to the abdomen where post suction dressings were placed..  Patient reports she suspects the pain that she is secondary to tape that might have been used during surgery.  Also reports some oozing after  site.  Was recommended to come to the emergency room at [facility] for follow-up. Denies fevers, chills, nausea, vomiting, chest pain, shortness of breath, headache, visual changes, change in urination

## 2024-06-22 NOTE — ED PROVIDER NOTE - CCCP TRG CHIEF CMPLNT
Subjective:       Patient ID: Omid Rascon is a 63 y.o. female      Chief Complaint   Patient presents with    Concerns About Ocular Health    Hypertensive Eye Exam     History of Present Illness   Dls: 10/7/19 Dr. Sandoval    64 y/o female presents today for ocular health check.  Pt states no changes in vision. Pt wears pal's.     No tearing  No itching  No burning  No pain  No ha's  No floaters  No flashes    Eye meds  None          Assessment/Plan:     1. Nuclear sclerosis of both eyes  Educated pt on presence of cataracts and effects on vision. No surgery at this time. Recheck in one year.    2. Refractive error  Educated patient on refractive error and discussed lens options. Dispensed updated spectacle Rx. Educated about adaptation period to new specs.    Eyeglass Final Rx     Eyeglass Final Rx       Sphere Cylinder Axis Add    Right +1.50 +0.50 015 +2.50    Left +1.00 +0.50 170 +2.50    Expiration Date: 8/5/2021                  Follow up in about 1 year (around 8/4/2021).         
post partum rash

## 2024-06-22 NOTE — ED PROVIDER NOTE - PHYSICAL EXAMINATION
Gen: no acute distress, well appearing, awake, alert and oriented x 3  Cardiac: regular rate and rhythm, +S1S2  Pulm: Clear to auscultation bilaterally  Abd: soft, nontender, cesction dressings in place nondistended, no guarding  Back: neg CVA ttp, nontender spine  Extremity: no edema, no deformity, warm and well perfused, FROM all extremities    Neuro: awake, alert, oriented x 3, sensorimotor intact  Psych: normal affect  Skin: + rash noted rectangular patch on the back and over right side abd, mildly oozing serous fluid, no purulent drainage, no foul smell

## 2024-06-22 NOTE — ED ADULT TRIAGE NOTE - CHIEF COMPLAINT QUOTE
Pt arrives to ED s/p  on 24.  Pt has known allergy to adhesives.  Pt c/o rash where surgical tape was placed at epidural site and around abdomen.  Pt complaints of itching, redness and bumps.  Pt states she ate chinese food before arrival and has "white coat syndrome" and typically has higher BP upon arrival.  [facility] L&D states pt should be seen in main ED and if pressure does not improve ED providers should speak with L&D.

## 2024-06-22 NOTE — ED ADULT NURSE NOTE - OBJECTIVE STATEMENT
received pt to intake. A&OX4 RR even unlabored completing full sentences. Pt present c/o rash. pt s/p  on 24.  Pt has known allergy to adhesives.  Pt now endorsing rash where surgical tape was placed at epidural site and around abdomen.  endorsing itching, redness and bumps.  Pt states she ate chinese food before arrival and has "white coat syndrome" and typically has higher BP upon arrival. upon RN assessment pt requesting to leave, does not want to stay any longer due to having a young child at home. pt educated on benefits/risks of leaving/staying. provider Zaki Shearer made aware.

## 2024-06-22 NOTE — ED ADULT NURSE NOTE - NS ED NURSE ELOPE COMMENTS
pt requesting to leave, does not want to stay any longer due to having a young child at home. pt educated on benefits/risks of leaving/staying. provider Zaki Shearer made aware. pt still deciding to leave.

## 2024-06-24 ENCOUNTER — NON-APPOINTMENT (OUTPATIENT)
Age: 49
End: 2024-06-24

## 2024-06-24 ENCOUNTER — APPOINTMENT (OUTPATIENT)
Dept: OBGYN | Facility: CLINIC | Age: 49
End: 2024-06-24
Payer: COMMERCIAL

## 2024-06-24 ENCOUNTER — APPOINTMENT (OUTPATIENT)
Dept: DERMATOLOGY | Facility: CLINIC | Age: 49
End: 2024-06-24
Payer: COMMERCIAL

## 2024-06-24 ENCOUNTER — APPOINTMENT (OUTPATIENT)
Dept: OBGYN | Facility: CLINIC | Age: 49
End: 2024-06-24

## 2024-06-24 VITALS — HEIGHT: 62 IN | WEIGHT: 192 LBS | BODY MASS INDEX: 35.33 KG/M2

## 2024-06-24 DIAGNOSIS — L30.9 DERMATITIS, UNSPECIFIED: ICD-10-CM

## 2024-06-24 DIAGNOSIS — N39.0 URINARY TRACT INFECTION, SITE NOT SPECIFIED: ICD-10-CM

## 2024-06-24 DIAGNOSIS — L23.9 ALLERGIC CONTACT DERMATITIS, UNSPECIFIED CAUSE: ICD-10-CM

## 2024-06-24 LAB
BILIRUB UR QL STRIP: NEGATIVE
CLARITY UR: CLEAR
COLLECTION METHOD: NORMAL
GLUCOSE UR-MCNC: NEGATIVE
HCG UR QL: 0.2 EU/DL
HGB UR QL STRIP.AUTO: NORMAL
KETONES UR-MCNC: NEGATIVE
LEUKOCYTE ESTERASE UR QL STRIP: NORMAL
NITRITE UR QL STRIP: NEGATIVE
PH UR STRIP: 6
PROT UR STRIP-MCNC: 100
SP GR UR STRIP: 1.03

## 2024-06-24 PROCEDURE — 99204 OFFICE O/P NEW MOD 45 MIN: CPT

## 2024-06-24 PROCEDURE — 99213 OFFICE O/P EST LOW 20 MIN: CPT

## 2024-06-24 PROCEDURE — 99211 OFF/OP EST MAY X REQ PHY/QHP: CPT

## 2024-06-24 RX ORDER — TRIAMCINOLONE ACETONIDE 1 MG/G
0.1 OINTMENT TOPICAL
Qty: 1 | Refills: 2 | Status: ACTIVE | COMMUNITY
Start: 2024-06-24 | End: 1900-01-01

## 2024-06-25 ENCOUNTER — APPOINTMENT (OUTPATIENT)
Dept: OBGYN | Facility: CLINIC | Age: 49
End: 2024-06-25

## 2024-06-25 RX ORDER — PREDNISONE 20 MG/1
20 TABLET ORAL
Qty: 24 | Refills: 0 | Status: ACTIVE | COMMUNITY
Start: 2024-06-25 | End: 1900-01-01

## 2024-07-02 ENCOUNTER — APPOINTMENT (OUTPATIENT)
Dept: OBGYN | Facility: CLINIC | Age: 49
End: 2024-07-02

## 2024-07-09 ENCOUNTER — NON-APPOINTMENT (OUTPATIENT)
Age: 49
End: 2024-07-09

## 2024-07-29 ENCOUNTER — NON-APPOINTMENT (OUTPATIENT)
Age: 49
End: 2024-07-29

## 2024-08-05 ENCOUNTER — APPOINTMENT (OUTPATIENT)
Age: 49
End: 2024-08-05

## 2024-08-05 ENCOUNTER — NON-APPOINTMENT (OUTPATIENT)
Age: 49
End: 2024-08-05

## 2024-08-05 PROCEDURE — 93000 ELECTROCARDIOGRAM COMPLETE: CPT

## 2024-08-05 PROCEDURE — 99203 OFFICE O/P NEW LOW 30 MIN: CPT | Mod: 25

## 2024-08-05 NOTE — DISCUSSION/SUMMARY
[FreeTextEntry1] : 48 year old woman  who delivered at 37.6 weeks on 24 with course complicated by GHTN. Was put on Labetalol 100 mg BID Can hold if BP if less than 125/85 Will wean off FU as needed [EKG obtained to assist in diagnosis and management of assessed problem(s)] : EKG obtained to assist in diagnosis and management of assessed problem(s)

## 2024-08-05 NOTE — HISTORY OF PRESENT ILLNESS
[FreeTextEntry1] : 48 year old woman  who delivered at 37.6 weeks on 24 with course complicated by GHTN. Was put on Labetalol 100 mg BID  EKG normal

## 2024-08-13 ENCOUNTER — APPOINTMENT (OUTPATIENT)
Dept: OBGYN | Facility: CLINIC | Age: 49
End: 2024-08-13
Payer: COMMERCIAL

## 2024-08-13 VITALS
SYSTOLIC BLOOD PRESSURE: 145 MMHG | HEIGHT: 62 IN | DIASTOLIC BLOOD PRESSURE: 93 MMHG | BODY MASS INDEX: 33.49 KG/M2 | HEART RATE: 84 BPM | WEIGHT: 182 LBS

## 2024-08-13 DIAGNOSIS — M65.4 RADIAL STYLOID TENOSYNOVITIS [DE QUERVAIN]: ICD-10-CM

## 2024-08-13 DIAGNOSIS — L23.9 ALLERGIC CONTACT DERMATITIS, UNSPECIFIED CAUSE: ICD-10-CM

## 2024-08-13 DIAGNOSIS — O24.419 GESTATIONAL DIABETES MELLITUS IN PREGNANCY, UNSPECIFIED CONTROL: ICD-10-CM

## 2024-08-13 PROCEDURE — 0503F POSTPARTUM CARE VISIT: CPT

## 2024-08-19 NOTE — HISTORY OF PRESENT ILLNESS
[Postpartum Consultation] : postpartum consultation [Delivery Date: ___] : on [unfilled] [Boy] : baby is a boy [Infant's Name ___] : [unfilled] [___ Lbs] : [unfilled] lbs [___ Oz] : [unfilled] oz [Not Circumcised] : not circumcised [Living at Home] : is currently living at home [Bottle Feeding] : bottle feeding [Resumed Menses] : has resumed her menses [Complications:___] : no complications [Primary C/S] : delivered by  section [BTL] : no tubal ligation [Breastfeeding] : not currently nursing [Resumed [place]] : has not resumed intercourse [Intended Contraception] : the patient does not intended to use contraception postpartum [Abdominal Pain] : no abdominal pain [Back Pain] : no back pain [Chest Pain] : no chest pain [S/Sx PP Depression] : no signs/symptoms of postpartum depression [Heavy Bleeding] : no heavy bleeding [Incisional Drainage] : no incisional drainage [Incisional Pain] : no incisional pain [Irregular Bleeding] : no irregular bleeding [Leg Pain] : no leg pain [Shortness of Breath] : no shortness of breath [Suicidal Ideation] : no suicidal ideation [Vaginal Discharge] : no vaginal discharge [Back to Normal] : is back to normal in size [None] : no vaginal bleeding [Doing Well] : is doing well [No Sign of Infection] : is showing no signs of infection [de-identified] : s/p pLTCS for cat 2 tracing, doing well, no complaints. Incision well healed. Pt had severe skin reaction in the area of surgical drape, seen by derm and given steroids. Also had this rash around surgical sites after gastric sleeve. Will refer to allergist for testing to identify specific allergen. Also reports worsening [place]ervian tendonitis, requesting to see ortho, referral placed EPDS reviewed, mood stable, Declines contraception. BPs controlled. Plan for 2 hr GTT  RTO for annual

## 2024-08-19 NOTE — HISTORY OF PRESENT ILLNESS
[Postpartum Consultation] : postpartum consultation [Delivery Date: ___] : on [unfilled] [Boy] : baby is a boy [Infant's Name ___] : [unfilled] [___ Lbs] : [unfilled] lbs [___ Oz] : [unfilled] oz [Not Circumcised] : not circumcised [Living at Home] : is currently living at home [Bottle Feeding] : bottle feeding [Resumed Menses] : has resumed her menses [Complications:___] : no complications [Primary C/S] : delivered by  section [BTL] : no tubal ligation [Breastfeeding] : not currently nursing [Resumed Gaylord] : has not resumed intercourse [Intended Contraception] : the patient does not intended to use contraception postpartum [Abdominal Pain] : no abdominal pain [Back Pain] : no back pain [Chest Pain] : no chest pain [S/Sx PP Depression] : no signs/symptoms of postpartum depression [Heavy Bleeding] : no heavy bleeding [Incisional Drainage] : no incisional drainage [Incisional Pain] : no incisional pain [Irregular Bleeding] : no irregular bleeding [Leg Pain] : no leg pain [Shortness of Breath] : no shortness of breath [Suicidal Ideation] : no suicidal ideation [Vaginal Discharge] : no vaginal discharge [Back to Normal] : is back to normal in size [None] : no vaginal bleeding [Doing Well] : is doing well [No Sign of Infection] : is showing no signs of infection [de-identified] : s/p pLTCS for cat 2 tracing, doing well, no complaints. Incision well healed. Pt had severe skin reaction in the area of surgical drape, seen by derm and given steroids. Also had this rash around surgical sites after gastric sleeve. Will refer to allergist for testing to identify specific allergen. Also reports worsening De Quervian tendonitis, requesting to see ortho, referral placed EPDS reviewed, mood stable, Declines contraception. BPs controlled. Plan for 2 hr GTT  RTO for annual

## 2024-08-30 ENCOUNTER — APPOINTMENT (OUTPATIENT)
Dept: ORTHOPEDIC SURGERY | Facility: CLINIC | Age: 49
End: 2024-08-30
Payer: COMMERCIAL

## 2024-08-30 DIAGNOSIS — M65.4 RADIAL STYLOID TENOSYNOVITIS [DE QUERVAIN]: ICD-10-CM

## 2024-08-30 PROCEDURE — 99204 OFFICE O/P NEW MOD 45 MIN: CPT | Mod: 25

## 2024-08-30 PROCEDURE — 20550 NJX 1 TENDON SHEATH/LIGAMENT: CPT | Mod: 50

## 2024-08-30 NOTE — PROCEDURE
[] : left and right  [FreeTextEntry1] : 48-year-old female with bilateral first extensor compartment tendinitis status post corticosteroid injections today.  Discussed therapy however she would like to defer for now.  Has splints at home.  De Quervain Tenosynovitis: The anatomy and physiology of de Quervain's tenosynovitis was discussed with the patient today in the office.  Edema and increased contact pressure within the first dorsal extensor compartment at the radial styloid can cause pain, crepitation, and limitation of function.  Treatment options include resting thumb spica splints to decrease edema, oral anti-inflammatory medications, home or formal therapy exercises, up to 2 steroid injections within the first dorsal extensor compartment, or surgical release.  While majority of patients do respond to conservative treatment, up to 20% may require surgical release.

## 2024-08-30 NOTE — PHYSICAL EXAM
[de-identified] :  B/L wrist/hand: TTP over first extensor compartment NTTP at 1st CMC joint or snuffbox  Pain with Finklestein maneuver  Can make a full fist and has full finger extension Hand is warm and well perfused  sensation intact to light touch at fingertips

## 2024-08-30 NOTE — HISTORY OF PRESENT ILLNESS
[FreeTextEntry1] : This is a very pleasant 48-year-old female who is presenting to me for bilateral atraumatic radial sided wrist pain that has been present for 2 months.  She reports that the pain started postpartum, she has a 2-month-old baby boy at home.  The pain is aggravated while holding and trying to feed her son.  Denies any paresthesias.  Pain is dull and aching constant.

## 2025-01-13 ENCOUNTER — APPOINTMENT (OUTPATIENT)
Dept: MAMMOGRAPHY | Facility: CLINIC | Age: 50
End: 2025-01-13

## 2025-01-13 ENCOUNTER — APPOINTMENT (OUTPATIENT)
Dept: ULTRASOUND IMAGING | Facility: CLINIC | Age: 50
End: 2025-01-13

## 2025-01-23 ENCOUNTER — APPOINTMENT (OUTPATIENT)
Dept: MAMMOGRAPHY | Facility: CLINIC | Age: 50
End: 2025-01-23
Payer: COMMERCIAL

## 2025-01-23 ENCOUNTER — APPOINTMENT (OUTPATIENT)
Dept: ULTRASOUND IMAGING | Facility: CLINIC | Age: 50
End: 2025-01-23

## 2025-01-23 ENCOUNTER — APPOINTMENT (OUTPATIENT)
Dept: ULTRASOUND IMAGING | Facility: IMAGING CENTER | Age: 50
End: 2025-01-23

## 2025-01-23 ENCOUNTER — APPOINTMENT (OUTPATIENT)
Dept: MAMMOGRAPHY | Facility: IMAGING CENTER | Age: 50
End: 2025-01-23

## 2025-01-23 PROCEDURE — 77063 BREAST TOMOSYNTHESIS BI: CPT

## 2025-01-23 PROCEDURE — 77067 SCR MAMMO BI INCL CAD: CPT

## 2025-02-28 DIAGNOSIS — M65.4 RADIAL STYLOID TENOSYNOVITIS [DE QUERVAIN]: ICD-10-CM

## 2025-04-23 ENCOUNTER — NON-APPOINTMENT (OUTPATIENT)
Age: 50
End: 2025-04-23

## 2025-04-25 ENCOUNTER — APPOINTMENT (OUTPATIENT)
Dept: DERMATOLOGY | Facility: CLINIC | Age: 50
End: 2025-04-25
Payer: COMMERCIAL

## 2025-04-25 DIAGNOSIS — L70.0 ACNE VULGARIS: ICD-10-CM

## 2025-04-25 DIAGNOSIS — Z12.83 ENCOUNTER FOR SCREENING FOR MALIGNANT NEOPLASM OF SKIN: ICD-10-CM

## 2025-04-25 DIAGNOSIS — L85.8 OTHER SPECIFIED EPIDERMAL THICKENING: ICD-10-CM

## 2025-04-25 DIAGNOSIS — A63.0 ANOGENITAL (VENEREAL) WARTS: ICD-10-CM

## 2025-04-25 DIAGNOSIS — D22.9 MELANOCYTIC NEVI, UNSPECIFIED: ICD-10-CM

## 2025-04-25 PROCEDURE — 99214 OFFICE O/P EST MOD 30 MIN: CPT | Mod: 25

## 2025-04-25 PROCEDURE — 46916 CRYOSURGERY ANAL LESION(S): CPT

## 2025-04-25 RX ORDER — CLINDAMYCIN PHOSPHATE 10 MG/ML
1 LOTION TOPICAL
Qty: 1 | Refills: 11 | Status: ACTIVE | COMMUNITY
Start: 2025-04-25 | End: 1900-01-01

## 2025-07-11 ENCOUNTER — APPOINTMENT (OUTPATIENT)
Dept: DERMATOLOGY | Facility: CLINIC | Age: 50
End: 2025-07-11

## 2025-07-11 PROCEDURE — 99203 OFFICE O/P NEW LOW 30 MIN: CPT | Mod: 25

## 2025-07-11 PROCEDURE — 46916 CRYOSURGERY ANAL LESION(S): CPT

## 2025-07-11 RX ORDER — IMIQUIMOD 50 MG/G
5 CREAM TOPICAL
Qty: 1 | Refills: 2 | Status: ACTIVE | COMMUNITY
Start: 2025-07-11 | End: 1900-01-01

## 2025-07-14 ENCOUNTER — APPOINTMENT (OUTPATIENT)
Dept: DERMATOLOGY | Facility: CLINIC | Age: 50
End: 2025-07-14
Payer: COMMERCIAL

## 2025-07-14 VITALS — WEIGHT: 166 LBS | HEIGHT: 62 IN | BODY MASS INDEX: 30.55 KG/M2

## 2025-07-14 PROBLEM — R58 ECCHYMOSIS: Status: ACTIVE | Noted: 2025-07-13

## 2025-07-14 PROCEDURE — 99213 OFFICE O/P EST LOW 20 MIN: CPT

## 2025-08-13 ENCOUNTER — APPOINTMENT (OUTPATIENT)
Dept: FAMILY MEDICINE | Facility: CLINIC | Age: 50
End: 2025-08-13
Payer: COMMERCIAL

## 2025-08-13 VITALS
WEIGHT: 161 LBS | BODY MASS INDEX: 29.63 KG/M2 | OXYGEN SATURATION: 96 % | RESPIRATION RATE: 16 BRPM | DIASTOLIC BLOOD PRESSURE: 88 MMHG | HEART RATE: 95 BPM | SYSTOLIC BLOOD PRESSURE: 145 MMHG | TEMPERATURE: 98.1 F | HEIGHT: 62 IN

## 2025-08-13 DIAGNOSIS — L23.5 ALLERGIC CONTACT DERMATITIS DUE TO OTHER CHEMICAL PRODUCTS: ICD-10-CM

## 2025-08-13 DIAGNOSIS — M54.2 CERVICALGIA: ICD-10-CM

## 2025-08-13 DIAGNOSIS — I10 ESSENTIAL (PRIMARY) HYPERTENSION: ICD-10-CM

## 2025-08-13 DIAGNOSIS — R73.03 PREDIABETES.: ICD-10-CM

## 2025-08-13 DIAGNOSIS — E66.9 OBESITY, UNSPECIFIED: ICD-10-CM

## 2025-08-13 DIAGNOSIS — E78.5 HYPERLIPIDEMIA, UNSPECIFIED: ICD-10-CM

## 2025-08-13 PROCEDURE — 99386 PREV VISIT NEW AGE 40-64: CPT

## 2025-08-13 PROCEDURE — 36415 COLL VENOUS BLD VENIPUNCTURE: CPT

## 2025-08-13 RX ORDER — OMEPRAZOLE 20 MG/1
20 TABLET, DELAYED RELEASE ORAL
Refills: 0 | Status: ACTIVE | COMMUNITY

## 2025-08-13 RX ORDER — TIRZEPATIDE 12.5 MG/.5ML
12.5 INJECTION, SOLUTION SUBCUTANEOUS
Refills: 0 | Status: ACTIVE | COMMUNITY

## 2025-08-14 ENCOUNTER — TRANSCRIPTION ENCOUNTER (OUTPATIENT)
Age: 50
End: 2025-08-14

## 2025-08-14 LAB
25(OH)D3 SERPL-MCNC: 52 NG/ML
ALBUMIN SERPL ELPH-MCNC: 4.6 G/DL
ALP BLD-CCNC: 57 U/L
ALT SERPL-CCNC: 12 U/L
ANION GAP SERPL CALC-SCNC: 16 MMOL/L
AST SERPL-CCNC: 16 U/L
BASOPHILS # BLD AUTO: 0.05 K/UL
BASOPHILS NFR BLD AUTO: 0.8 %
BILIRUB SERPL-MCNC: 0.4 MG/DL
BUN SERPL-MCNC: 18 MG/DL
CALCIUM SERPL-MCNC: 10 MG/DL
CHLORIDE SERPL-SCNC: 104 MMOL/L
CHOLEST SERPL-MCNC: 209 MG/DL
CO2 SERPL-SCNC: 21 MMOL/L
CREAT SERPL-MCNC: 0.6 MG/DL
EGFRCR SERPLBLD CKD-EPI 2021: 111 ML/MIN/1.73M2
EOSINOPHIL # BLD AUTO: 0.1 K/UL
EOSINOPHIL NFR BLD AUTO: 1.7 %
ESTIMATED AVERAGE GLUCOSE: 108 MG/DL
FERRITIN SERPL-MCNC: 20 NG/ML
FOLATE SERPL-MCNC: 11.7 NG/ML
GLUCOSE SERPL-MCNC: 76 MG/DL
HBA1C MFR BLD HPLC: 5.4 %
HCT VFR BLD CALC: 41.1 %
HDLC SERPL-MCNC: 52 MG/DL
HGB BLD-MCNC: 13.2 G/DL
IMM GRANULOCYTES NFR BLD AUTO: 0.2 %
IRON SATN MFR SERPL: 14 %
IRON SERPL-MCNC: 52 UG/DL
LDLC SERPL-MCNC: 143 MG/DL
LYMPHOCYTES # BLD AUTO: 1.52 K/UL
LYMPHOCYTES NFR BLD AUTO: 25.3 %
MAN DIFF?: NORMAL
MCHC RBC-ENTMCNC: 27.1 PG
MCHC RBC-ENTMCNC: 32.1 G/DL
MCV RBC AUTO: 84.4 FL
MONOCYTES # BLD AUTO: 0.45 K/UL
MONOCYTES NFR BLD AUTO: 7.5 %
NEUTROPHILS # BLD AUTO: 3.87 K/UL
NEUTROPHILS NFR BLD AUTO: 64.5 %
NONHDLC SERPL-MCNC: 157 MG/DL
PLATELET # BLD AUTO: 258 K/UL
POTASSIUM SERPL-SCNC: 4.2 MMOL/L
PROT SERPL-MCNC: 7.9 G/DL
RBC # BLD: 4.87 M/UL
RBC # FLD: 15 %
SODIUM SERPL-SCNC: 142 MMOL/L
TIBC SERPL-MCNC: 385 UG/DL
TRIGL SERPL-MCNC: 83 MG/DL
TSH SERPL-ACNC: 1.35 UIU/ML
UIBC SERPL-MCNC: 333 UG/DL
VIT B12 SERPL-MCNC: 627 PG/ML
WBC # FLD AUTO: 6 K/UL

## 2025-08-15 ENCOUNTER — TRANSCRIPTION ENCOUNTER (OUTPATIENT)
Age: 50
End: 2025-08-15

## 2025-08-16 ENCOUNTER — APPOINTMENT (OUTPATIENT)
Dept: ORTHOPEDIC SURGERY | Facility: CLINIC | Age: 50
End: 2025-08-16

## 2025-08-16 DIAGNOSIS — M25.579 PAIN IN UNSPECIFIED ANKLE AND JOINTS OF UNSPECIFIED FOOT: ICD-10-CM

## 2025-08-18 ENCOUNTER — APPOINTMENT (OUTPATIENT)
Dept: ORTHOPEDIC SURGERY | Facility: CLINIC | Age: 50
End: 2025-08-18
Payer: COMMERCIAL

## 2025-08-18 ENCOUNTER — TRANSCRIPTION ENCOUNTER (OUTPATIENT)
Age: 50
End: 2025-08-18

## 2025-08-18 VITALS — WEIGHT: 161 LBS | BODY MASS INDEX: 29.63 KG/M2 | HEIGHT: 62 IN

## 2025-08-18 DIAGNOSIS — R29.898 OTHER SYMPTOMS AND SIGNS INVOLVING THE MUSCULOSKELETAL SYSTEM: ICD-10-CM

## 2025-08-18 DIAGNOSIS — M25.571 PAIN IN RIGHT ANKLE AND JOINTS OF RIGHT FOOT: ICD-10-CM

## 2025-08-18 PROCEDURE — 99203 OFFICE O/P NEW LOW 30 MIN: CPT

## 2025-08-18 PROCEDURE — 73630 X-RAY EXAM OF FOOT: CPT | Mod: RT

## 2025-08-18 PROCEDURE — 73600 X-RAY EXAM OF ANKLE: CPT | Mod: RT

## 2025-09-16 ENCOUNTER — APPOINTMENT (OUTPATIENT)
Dept: DERMATOLOGY | Facility: CLINIC | Age: 50
End: 2025-09-16

## 2025-09-16 DIAGNOSIS — D23.9 OTHER BENIGN NEOPLASM OF SKIN, UNSPECIFIED: ICD-10-CM

## 2025-09-16 DIAGNOSIS — L91.8 OTHER HYPERTROPHIC DISORDERS OF THE SKIN: ICD-10-CM
